# Patient Record
Sex: FEMALE | Race: BLACK OR AFRICAN AMERICAN | NOT HISPANIC OR LATINO | Employment: FULL TIME | URBAN - METROPOLITAN AREA
[De-identification: names, ages, dates, MRNs, and addresses within clinical notes are randomized per-mention and may not be internally consistent; named-entity substitution may affect disease eponyms.]

---

## 2020-11-20 ENCOUNTER — HOSPITAL ENCOUNTER (INPATIENT)
Facility: OTHER | Age: 50
LOS: 1 days | Discharge: HOME | DRG: 812 | End: 2020-11-21
Attending: EMERGENCY MEDICINE | Admitting: HOSPITALIST
Payer: COMMERCIAL

## 2020-11-20 DIAGNOSIS — J45.40 MODERATE PERSISTENT ASTHMA WITHOUT COMPLICATION: ICD-10-CM

## 2020-11-20 DIAGNOSIS — R00.1 BRADYCARDIA: ICD-10-CM

## 2020-11-20 DIAGNOSIS — T14.90XA TRAUMA: ICD-10-CM

## 2020-11-20 DIAGNOSIS — R94.31 PROLONGED Q-T INTERVAL ON ECG: ICD-10-CM

## 2020-11-20 DIAGNOSIS — R00.1 SYMPTOMATIC BRADYCARDIA: Primary | ICD-10-CM

## 2020-11-20 DIAGNOSIS — R00.1 SINUS BRADYCARDIA: ICD-10-CM

## 2020-11-20 DIAGNOSIS — D50.8 OTHER IRON DEFICIENCY ANEMIA: ICD-10-CM

## 2020-11-20 DIAGNOSIS — I95.1 ORTHOSTATIC HYPOTENSION: ICD-10-CM

## 2020-11-20 DIAGNOSIS — M48.30: ICD-10-CM

## 2020-11-20 DIAGNOSIS — D64.9 SEVERE ANEMIA: ICD-10-CM

## 2020-11-20 PROBLEM — D50.9 IRON DEFICIENCY ANEMIA: Status: ACTIVE | Noted: 2020-11-20

## 2020-11-20 LAB
ABO + RH BLD: NORMAL
ALBUMIN SERPL BCP-MCNC: 3.8 G/DL (ref 3.5–5.2)
ALP SERPL-CCNC: 69 U/L (ref 55–135)
ALT SERPL W/O P-5'-P-CCNC: 27 U/L (ref 10–44)
AMPHET+METHAMPHET UR QL: NEGATIVE
ANION GAP SERPL CALC-SCNC: 10 MMOL/L (ref 8–16)
ANISOCYTOSIS BLD QL SMEAR: SLIGHT
AST SERPL-CCNC: 30 U/L (ref 10–40)
B-HCG UR QL: NEGATIVE
BARBITURATES UR QL SCN>200 NG/ML: NEGATIVE
BASOPHILS # BLD AUTO: 0.02 K/UL (ref 0–0.2)
BASOPHILS NFR BLD: 0.3 % (ref 0–1.9)
BENZODIAZ UR QL SCN>200 NG/ML: NEGATIVE
BILIRUB SERPL-MCNC: 0.3 MG/DL (ref 0.1–1)
BLD GP AB SCN CELLS X3 SERPL QL: NORMAL
BLD PROD TYP BPU: NORMAL
BLD PROD TYP BPU: NORMAL
BLOOD UNIT EXPIRATION DATE: NORMAL
BLOOD UNIT EXPIRATION DATE: NORMAL
BLOOD UNIT TYPE CODE: 6200
BLOOD UNIT TYPE CODE: 6200
BLOOD UNIT TYPE: NORMAL
BLOOD UNIT TYPE: NORMAL
BNP SERPL-MCNC: 108 PG/ML (ref 0–99)
BUN SERPL-MCNC: 19 MG/DL (ref 6–20)
BZE UR QL SCN: NEGATIVE
CALCIUM SERPL-MCNC: 8.4 MG/DL (ref 8.7–10.5)
CANNABINOIDS UR QL SCN: NEGATIVE
CHLORIDE SERPL-SCNC: 101 MMOL/L (ref 95–110)
CO2 SERPL-SCNC: 20 MMOL/L (ref 23–29)
CODING SYSTEM: NORMAL
CODING SYSTEM: NORMAL
CREAT SERPL-MCNC: 1.4 MG/DL (ref 0.5–1.4)
CREAT UR-MCNC: 86.4 MG/DL (ref 15–325)
CTP QC/QA: YES
CTP QC/QA: YES
DACRYOCYTES BLD QL SMEAR: ABNORMAL
DIFFERENTIAL METHOD: ABNORMAL
DISPENSE STATUS: NORMAL
DISPENSE STATUS: NORMAL
EOSINOPHIL # BLD AUTO: 0.4 K/UL (ref 0–0.5)
EOSINOPHIL NFR BLD: 5.6 % (ref 0–8)
ERYTHROCYTE [DISTWIDTH] IN BLOOD BY AUTOMATED COUNT: 19.1 % (ref 11.5–14.5)
EST. GFR  (AFRICAN AMERICAN): 51 ML/MIN/1.73 M^2
EST. GFR  (NON AFRICAN AMERICAN): 44 ML/MIN/1.73 M^2
FERRITIN SERPL-MCNC: 7 NG/ML (ref 20–300)
GLUCOSE SERPL-MCNC: 101 MG/DL (ref 70–110)
HCT VFR BLD AUTO: 21.3 % (ref 37–48.5)
HGB BLD-MCNC: 5.3 G/DL (ref 12–16)
HYPOCHROMIA BLD QL SMEAR: ABNORMAL
IMM GRANULOCYTES # BLD AUTO: 0.01 K/UL (ref 0–0.04)
IMM GRANULOCYTES NFR BLD AUTO: 0.1 % (ref 0–0.5)
IRON SERPL-MCNC: 14 UG/DL (ref 30–160)
LYMPHOCYTES # BLD AUTO: 1.5 K/UL (ref 1–4.8)
LYMPHOCYTES NFR BLD: 22.7 % (ref 18–48)
MAGNESIUM SERPL-MCNC: 1.8 MG/DL (ref 1.6–2.6)
MCH RBC QN AUTO: 15.9 PG (ref 27–31)
MCHC RBC AUTO-ENTMCNC: 24.9 G/DL (ref 32–36)
MCV RBC AUTO: 64 FL (ref 82–98)
METHADONE UR QL SCN>300 NG/ML: NEGATIVE
MONOCYTES # BLD AUTO: 0.7 K/UL (ref 0.3–1)
MONOCYTES NFR BLD: 9.7 % (ref 4–15)
NEUTROPHILS # BLD AUTO: 4.2 K/UL (ref 1.8–7.7)
NEUTROPHILS NFR BLD: 61.6 % (ref 38–73)
NRBC BLD-RTO: 0 /100 WBC
OPIATES UR QL SCN: NEGATIVE
OVALOCYTES BLD QL SMEAR: ABNORMAL
PCP UR QL SCN>25 NG/ML: NEGATIVE
PLATELET # BLD AUTO: 420 K/UL (ref 150–350)
PLATELET BLD QL SMEAR: ABNORMAL
PMV BLD AUTO: 10.4 FL (ref 9.2–12.9)
POIKILOCYTOSIS BLD QL SMEAR: SLIGHT
POLYCHROMASIA BLD QL SMEAR: ABNORMAL
POTASSIUM SERPL-SCNC: 4.1 MMOL/L (ref 3.5–5.1)
PROT SERPL-MCNC: 7.2 G/DL (ref 6–8.4)
RBC # BLD AUTO: 3.33 M/UL (ref 4–5.4)
SARS-COV-2 RDRP RESP QL NAA+PROBE: NEGATIVE
SATURATED IRON: 2 % (ref 20–50)
SODIUM SERPL-SCNC: 131 MMOL/L (ref 136–145)
TOTAL IRON BINDING CAPACITY: 567 UG/DL (ref 250–450)
TOXICOLOGY INFORMATION: NORMAL
TRANS ERYTHROCYTES VOL PATIENT: NORMAL ML
TRANS ERYTHROCYTES VOL PATIENT: NORMAL ML
TRANSFERRIN SERPL-MCNC: 383 MG/DL (ref 200–375)
TROPONIN I SERPL DL<=0.01 NG/ML-MCNC: <0.006 NG/ML (ref 0–0.03)
TSH SERPL DL<=0.005 MIU/L-ACNC: 3.59 UIU/ML (ref 0.4–4)
WBC # BLD AUTO: 6.78 K/UL (ref 3.9–12.7)

## 2020-11-20 PROCEDURE — 99285 EMERGENCY DEPT VISIT HI MDM: CPT | Mod: 25

## 2020-11-20 PROCEDURE — 93005 ELECTROCARDIOGRAM TRACING: CPT

## 2020-11-20 PROCEDURE — 81025 URINE PREGNANCY TEST: CPT | Performed by: EMERGENCY MEDICINE

## 2020-11-20 PROCEDURE — 85025 COMPLETE CBC W/AUTO DIFF WBC: CPT

## 2020-11-20 PROCEDURE — 99223 PR INITIAL HOSPITAL CARE,LEVL III: ICD-10-PCS | Mod: ,,, | Performed by: INTERNAL MEDICINE

## 2020-11-20 PROCEDURE — 99223 1ST HOSP IP/OBS HIGH 75: CPT | Mod: ,,, | Performed by: HOSPITALIST

## 2020-11-20 PROCEDURE — 82728 ASSAY OF FERRITIN: CPT

## 2020-11-20 PROCEDURE — 99223 PR INITIAL HOSPITAL CARE,LEVL III: ICD-10-PCS | Mod: ,,, | Performed by: HOSPITALIST

## 2020-11-20 PROCEDURE — 86850 RBC ANTIBODY SCREEN: CPT

## 2020-11-20 PROCEDURE — 25000003 PHARM REV CODE 250: Performed by: EMERGENCY MEDICINE

## 2020-11-20 PROCEDURE — 83880 ASSAY OF NATRIURETIC PEPTIDE: CPT

## 2020-11-20 PROCEDURE — 93010 ELECTROCARDIOGRAM REPORT: CPT | Mod: 76,,, | Performed by: INTERNAL MEDICINE

## 2020-11-20 PROCEDURE — U0002 COVID-19 LAB TEST NON-CDC: HCPCS | Performed by: EMERGENCY MEDICINE

## 2020-11-20 PROCEDURE — 99223 1ST HOSP IP/OBS HIGH 75: CPT | Mod: ,,, | Performed by: INTERNAL MEDICINE

## 2020-11-20 PROCEDURE — 83735 ASSAY OF MAGNESIUM: CPT

## 2020-11-20 PROCEDURE — 99900035 HC TECH TIME PER 15 MIN (STAT)

## 2020-11-20 PROCEDURE — 93041 RHYTHM ECG TRACING: CPT

## 2020-11-20 PROCEDURE — 63600175 PHARM REV CODE 636 W HCPCS: Performed by: HOSPITALIST

## 2020-11-20 PROCEDURE — P9021 RED BLOOD CELLS UNIT: HCPCS

## 2020-11-20 PROCEDURE — 25000242 PHARM REV CODE 250 ALT 637 W/ HCPCS: Performed by: HOSPITALIST

## 2020-11-20 PROCEDURE — 25000242 PHARM REV CODE 250 ALT 637 W/ HCPCS: Performed by: EMERGENCY MEDICINE

## 2020-11-20 PROCEDURE — 27100098 HC SPACER

## 2020-11-20 PROCEDURE — 94640 AIRWAY INHALATION TREATMENT: CPT

## 2020-11-20 PROCEDURE — 25000003 PHARM REV CODE 250: Performed by: INTERNAL MEDICINE

## 2020-11-20 PROCEDURE — 80053 COMPREHEN METABOLIC PANEL: CPT

## 2020-11-20 PROCEDURE — 80307 DRUG TEST PRSMV CHEM ANLYZR: CPT

## 2020-11-20 PROCEDURE — 93010 EKG 12-LEAD: ICD-10-PCS | Mod: 76,,, | Performed by: INTERNAL MEDICINE

## 2020-11-20 PROCEDURE — 25000003 PHARM REV CODE 250: Performed by: HOSPITALIST

## 2020-11-20 PROCEDURE — 20000000 HC ICU ROOM

## 2020-11-20 PROCEDURE — 86920 COMPATIBILITY TEST SPIN: CPT

## 2020-11-20 PROCEDURE — 84484 ASSAY OF TROPONIN QUANT: CPT

## 2020-11-20 PROCEDURE — 36430 TRANSFUSION BLD/BLD COMPNT: CPT

## 2020-11-20 PROCEDURE — 84443 ASSAY THYROID STIM HORMONE: CPT

## 2020-11-20 PROCEDURE — 25000003 PHARM REV CODE 250: Performed by: NURSE PRACTITIONER

## 2020-11-20 PROCEDURE — 83540 ASSAY OF IRON: CPT

## 2020-11-20 PROCEDURE — 36415 COLL VENOUS BLD VENIPUNCTURE: CPT

## 2020-11-20 PROCEDURE — 93010 ELECTROCARDIOGRAM REPORT: CPT | Mod: ,,, | Performed by: INTERNAL MEDICINE

## 2020-11-20 PROCEDURE — 94761 N-INVAS EAR/PLS OXIMETRY MLT: CPT

## 2020-11-20 RX ORDER — HYDROCODONE BITARTRATE AND ACETAMINOPHEN 500; 5 MG/1; MG/1
TABLET ORAL
Status: DISCONTINUED | OUTPATIENT
Start: 2020-11-20 | End: 2020-11-21 | Stop reason: HOSPADM

## 2020-11-20 RX ORDER — CYCLOBENZAPRINE HCL 10 MG
TABLET ORAL
Status: ON HOLD | COMMUNITY
Start: 2020-09-01 | End: 2020-11-21 | Stop reason: HOSPADM

## 2020-11-20 RX ORDER — ALBUTEROL SULFATE 90 UG/1
2 AEROSOL, METERED RESPIRATORY (INHALATION) EVERY 6 HOURS PRN
Status: DISCONTINUED | OUTPATIENT
Start: 2020-11-20 | End: 2020-11-21 | Stop reason: HOSPADM

## 2020-11-20 RX ORDER — ESZOPICLONE 3 MG/1
3 TABLET, FILM COATED ORAL NIGHTLY PRN
COMMUNITY
Start: 2020-10-14 | End: 2022-02-07

## 2020-11-20 RX ORDER — IPRATROPIUM BROMIDE AND ALBUTEROL SULFATE 2.5; .5 MG/3ML; MG/3ML
3 SOLUTION RESPIRATORY (INHALATION)
Status: COMPLETED | OUTPATIENT
Start: 2020-11-20 | End: 2020-11-20

## 2020-11-20 RX ORDER — TRAMADOL HYDROCHLORIDE 50 MG/1
50 TABLET ORAL EVERY 6 HOURS PRN
Status: DISCONTINUED | OUTPATIENT
Start: 2020-11-20 | End: 2020-11-21 | Stop reason: HOSPADM

## 2020-11-20 RX ORDER — MUPIROCIN 20 MG/G
OINTMENT TOPICAL 2 TIMES DAILY
Status: DISCONTINUED | OUTPATIENT
Start: 2020-11-20 | End: 2020-11-21 | Stop reason: HOSPADM

## 2020-11-20 RX ORDER — CELECOXIB 200 MG/1
CAPSULE ORAL
COMMUNITY
Start: 2020-09-17 | End: 2021-11-06

## 2020-11-20 RX ORDER — FAMOTIDINE 20 MG/1
20 TABLET, FILM COATED ORAL 2 TIMES DAILY PRN
Status: DISCONTINUED | OUTPATIENT
Start: 2020-11-20 | End: 2020-11-21 | Stop reason: HOSPADM

## 2020-11-20 RX ORDER — TALC
6 POWDER (GRAM) TOPICAL NIGHTLY PRN
Status: DISCONTINUED | OUTPATIENT
Start: 2020-11-20 | End: 2020-11-20 | Stop reason: SDUPTHER

## 2020-11-20 RX ORDER — LORAZEPAM 0.5 MG/1
TABLET ORAL
COMMUNITY
Start: 2020-10-14 | End: 2022-02-14

## 2020-11-20 RX ORDER — SODIUM CHLORIDE 0.9 % (FLUSH) 0.9 %
10 SYRINGE (ML) INJECTION
Status: DISCONTINUED | OUTPATIENT
Start: 2020-11-20 | End: 2020-11-21 | Stop reason: HOSPADM

## 2020-11-20 RX ORDER — TALC
6 POWDER (GRAM) TOPICAL NIGHTLY PRN
Status: DISCONTINUED | OUTPATIENT
Start: 2020-11-20 | End: 2020-11-21 | Stop reason: HOSPADM

## 2020-11-20 RX ORDER — METOPROLOL SUCCINATE 100 MG/1
100 TABLET, EXTENDED RELEASE ORAL DAILY
Status: ON HOLD | COMMUNITY
Start: 2020-07-15 | End: 2020-11-21 | Stop reason: HOSPADM

## 2020-11-20 RX ORDER — POLYETHYLENE GLYCOL 3350 17 G/17G
17 POWDER, FOR SOLUTION ORAL 2 TIMES DAILY PRN
Status: DISCONTINUED | OUTPATIENT
Start: 2020-11-20 | End: 2020-11-21 | Stop reason: HOSPADM

## 2020-11-20 RX ORDER — HYDROCHLOROTHIAZIDE 25 MG/1
25 TABLET ORAL DAILY
Status: ON HOLD | COMMUNITY
Start: 2020-08-13 | End: 2020-11-21 | Stop reason: HOSPADM

## 2020-11-20 RX ORDER — BUPROPION HYDROCHLORIDE 150 MG/1
300 TABLET ORAL DAILY
Status: DISCONTINUED | OUTPATIENT
Start: 2020-11-20 | End: 2020-11-21 | Stop reason: HOSPADM

## 2020-11-20 RX ORDER — BUPROPION HYDROCHLORIDE 300 MG/1
300 TABLET ORAL DAILY
COMMUNITY
Start: 2020-07-22 | End: 2022-02-14

## 2020-11-20 RX ORDER — ONDANSETRON 4 MG/1
4 TABLET, FILM COATED ORAL EVERY 6 HOURS PRN
Status: DISCONTINUED | OUTPATIENT
Start: 2020-11-20 | End: 2020-11-21 | Stop reason: HOSPADM

## 2020-11-20 RX ORDER — SODIUM CHLORIDE 9 MG/ML
500 INJECTION, SOLUTION INTRAVENOUS
Status: COMPLETED | OUTPATIENT
Start: 2020-11-20 | End: 2020-11-20

## 2020-11-20 RX ORDER — ZOLPIDEM TARTRATE 5 MG/1
5 TABLET ORAL NIGHTLY PRN
Status: COMPLETED | OUTPATIENT
Start: 2020-11-20 | End: 2020-11-20

## 2020-11-20 RX ORDER — DICLOFENAC SODIUM AND MISOPROSTOL 75; 200 MG/1; UG/1
1 TABLET, DELAYED RELEASE ORAL 2 TIMES DAILY
Status: ON HOLD | COMMUNITY
Start: 2020-09-02 | End: 2020-11-21 | Stop reason: HOSPADM

## 2020-11-20 RX ORDER — ALBUTEROL SULFATE 90 UG/1
2 AEROSOL, METERED RESPIRATORY (INHALATION) EVERY 6 HOURS PRN
COMMUNITY
Start: 2020-01-23 | End: 2022-05-23

## 2020-11-20 RX ORDER — LORAZEPAM 0.5 MG/1
0.5 TABLET ORAL EVERY 6 HOURS PRN
Status: DISCONTINUED | OUTPATIENT
Start: 2020-11-20 | End: 2020-11-21 | Stop reason: HOSPADM

## 2020-11-20 RX ORDER — FLUTICASONE FUROATE AND VILANTEROL 100; 25 UG/1; UG/1
1 POWDER RESPIRATORY (INHALATION) DAILY
COMMUNITY
Start: 2020-07-02

## 2020-11-20 RX ORDER — FAMOTIDINE 20 MG/1
20 TABLET, FILM COATED ORAL 2 TIMES DAILY PRN
COMMUNITY
Start: 2020-05-04 | End: 2023-11-01

## 2020-11-20 RX ORDER — GABAPENTIN 300 MG/1
300 CAPSULE ORAL NIGHTLY
Status: DISCONTINUED | OUTPATIENT
Start: 2020-11-20 | End: 2020-11-21 | Stop reason: HOSPADM

## 2020-11-20 RX ORDER — GABAPENTIN 300 MG/1
400 CAPSULE ORAL NIGHTLY
COMMUNITY
Start: 2020-09-09 | End: 2022-08-01

## 2020-11-20 RX ORDER — FLUTICASONE FUROATE AND VILANTEROL 100; 25 UG/1; UG/1
1 POWDER RESPIRATORY (INHALATION) DAILY
Status: DISCONTINUED | OUTPATIENT
Start: 2020-11-20 | End: 2020-11-21 | Stop reason: HOSPADM

## 2020-11-20 RX ORDER — ACETAMINOPHEN 325 MG/1
650 TABLET ORAL EVERY 4 HOURS PRN
Status: DISCONTINUED | OUTPATIENT
Start: 2020-11-20 | End: 2020-11-21 | Stop reason: HOSPADM

## 2020-11-20 RX ADMIN — SODIUM CHLORIDE 500 ML: 0.9 INJECTION, SOLUTION INTRAVENOUS at 11:11

## 2020-11-20 RX ADMIN — IPRATROPIUM BROMIDE AND ALBUTEROL SULFATE 3 ML: .5; 3 SOLUTION RESPIRATORY (INHALATION) at 11:11

## 2020-11-20 RX ADMIN — BUPROPION HYDROCHLORIDE 300 MG: 150 TABLET, FILM COATED, EXTENDED RELEASE ORAL at 02:11

## 2020-11-20 RX ADMIN — Medication 6 MG: at 09:11

## 2020-11-20 RX ADMIN — GABAPENTIN 300 MG: 300 CAPSULE ORAL at 09:11

## 2020-11-20 RX ADMIN — PROMETHAZINE HYDROCHLORIDE 25 MG: 25 INJECTION INTRAMUSCULAR; INTRAVENOUS at 08:11

## 2020-11-20 RX ADMIN — ZOLPIDEM TARTRATE 5 MG: 5 TABLET ORAL at 11:11

## 2020-11-20 RX ADMIN — MUPIROCIN: 20 OINTMENT TOPICAL at 09:11

## 2020-11-20 RX ADMIN — ALBUTEROL SULFATE 2 PUFF: 90 AEROSOL, METERED RESPIRATORY (INHALATION) at 09:11

## 2020-11-20 RX ADMIN — ONDANSETRON HYDROCHLORIDE 4 MG: 4 TABLET, FILM COATED ORAL at 07:11
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
## 2020-11-20 NOTE — ASSESSMENT & PLAN NOTE
- Patient with sinus bradycardia and hypotension, likely medication induced (beta blocker, cyclobenzaprine).  - Patient started on metoprolol a few months ago, dose increased recently by PCP.  Previously was on amlodipine.  - Hold beta blocker & cyclobenzaprine, monitor in ICU, watch for development of HTN.  - Cardiologist consulted.
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
## 2020-11-20 NOTE — ASSESSMENT & PLAN NOTE
- Orthostatic hypotension due to hypovolemia from anemia, bradycardia.  - Currently very weak and fatigued, continue on bed rest for now.  - Hold antihypertensives, muscle relaxant.

## 2020-11-20 NOTE — PLAN OF CARE
CM met with pt for initial discharge planning assessment. Pt is alert and oriented at time of assessment.    Pt confirmed demographic information is not correct in Epic, she provided her current address, CM corrected in Epic. PCP is correct, , and pharmacy of choice is [facility] on [address].    Pt is usually independent, uses no DME and with no HH. Pt is not on HD and does not take coumadin.  Pt states she takes meds as ordered, and has  no financial issues with meds at this time.    Pt will likely have no CM needs for discharge.   11/20/20 6863   Discharge Assessment   Assessment Type Discharge Planning Assessment   Confirmed/corrected address and phone number on facesheet? Yes   Assessment information obtained from? Patient;Medical Record   Expected Length of Stay (days) 3   Communicated expected length of stay with patient/caregiver yes   Prior to hospitilization cognitive status: Alert/Oriented   Prior to hospitalization functional status: Independent   Current cognitive status: Alert/Oriented   Current Functional Status: Needs Assistance   Lives With child(kevon), adult   Able to Return to Prior Arrangements yes   Is patient able to care for self after discharge? Yes   Patient's perception of discharge disposition home or selfcare   Readmission Within the Last 30 Days no previous admission in last 30 days   Patient currently being followed by outpatient case management? No   Patient currently receives any other outside agency services? No   Equipment Currently Used at Home none   Do you have any problems affording any of your prescribed medications? No   Is the patient taking medications as prescribed? yes   Does the patient have transportation home? Yes   Transportation Anticipated family or friend will provide;car, drives self   Does the patient receive services at the [facility]? No   Discharge Plan A Home   Discharge Plan B Home   DME Needed Upon Discharge  none   Patient/Family  in Agreement with Plan yes

## 2020-11-20 NOTE — ED TRIAGE NOTES
Pt AAOx4,very groggy and lethargic in bed. Pt reports falling last night in the shower due to weakness in BLE. Pt denies LOC, or hurting herself. Pt denies SOB, chest pain, N/V/D and/or fever/chills.
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
## 2020-11-20 NOTE — ASSESSMENT & PLAN NOTE
- Patient with iron deficiency, hospitalized for transfusions in 2018 and then referred to hematology for Feraheme infusions.  These were discontinued due to her having side effects including joint pain and fatigue.  - Hematologist at [facility] referred to her having heavy menses but patient denies this.  - Patient being transfused 2 units with Hb 5.3.  Iron studies pending but would expect iron stores to be very low with MCV 64.  - Full GI workup has been done with EGD/colonoscopy several years ago, no abnormalities seen.  - Will consult hematology, plan for iron infusions while here.
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
## 2020-11-20 NOTE — HOSPITAL COURSE
Patient was admitted to ICU, transfused 2 units PRBCs.  Cardiology was consulted and agreed with holding beta blocker and monitoring closely.  Overnight bradycardia resolved and HR was in the 60's-70's.  She felt much better after transfusion.  Iron studies were done with iron stores 14, TIBC 567, ferritin 7.  Her H/H improved appropriately to 7.4/26.2 after transfusion.  She did have some wheezing on the morning after admission requiring albuterol administration.  As she uses her rescue inhaler about twice daily at home she was continued on her steroid/LABA combination inhaler.    Patient will benefit from resumption of iron infusions.  She was given a 500 mg IV dose of Venofer prior to discharge and will need another dose in 2 weeks.  She should follow up with her hematologist at that time.  Her BP was starting to increase on discharge and because metoprolol was discontinued she was started on losartan/HCTZ 100/25 mg daily.  She will need to follow up with her PCP for blood pressure management in 1-2 weeks.  She has an SHIRAZ scheduled with Dr. Scruggs on Monday and may keep that appointment.

## 2020-11-21 VITALS
SYSTOLIC BLOOD PRESSURE: 161 MMHG | WEIGHT: 188.69 LBS | OXYGEN SATURATION: 100 % | HEART RATE: 56 BPM | DIASTOLIC BLOOD PRESSURE: 77 MMHG | HEIGHT: 65 IN | RESPIRATION RATE: 17 BRPM | BODY MASS INDEX: 31.44 KG/M2 | TEMPERATURE: 99 F

## 2020-11-21 PROBLEM — J45.40 MODERATE PERSISTENT ASTHMA WITHOUT COMPLICATION: Status: ACTIVE | Noted: 2020-11-21

## 2020-11-21 LAB
ANION GAP SERPL CALC-SCNC: 7 MMOL/L (ref 8–16)
BASOPHILS # BLD AUTO: 0.03 K/UL (ref 0–0.2)
BASOPHILS NFR BLD: 0.2 % (ref 0–1.9)
BUN SERPL-MCNC: 14 MG/DL (ref 6–20)
CALCIUM SERPL-MCNC: 8.7 MG/DL (ref 8.7–10.5)
CHLORIDE SERPL-SCNC: 108 MMOL/L (ref 95–110)
CO2 SERPL-SCNC: 23 MMOL/L (ref 23–29)
CREAT SERPL-MCNC: 1.2 MG/DL (ref 0.5–1.4)
DIFFERENTIAL METHOD: ABNORMAL
EOSINOPHIL # BLD AUTO: 0.3 K/UL (ref 0–0.5)
EOSINOPHIL NFR BLD: 2 % (ref 0–8)
ERYTHROCYTE [DISTWIDTH] IN BLOOD BY AUTOMATED COUNT: 22.9 % (ref 11.5–14.5)
EST. GFR  (AFRICAN AMERICAN): >60 ML/MIN/1.73 M^2
EST. GFR  (NON AFRICAN AMERICAN): 54 ML/MIN/1.73 M^2
GLUCOSE SERPL-MCNC: 77 MG/DL (ref 70–110)
HCT VFR BLD AUTO: 26.2 % (ref 37–48.5)
HGB BLD-MCNC: 7.4 G/DL (ref 12–16)
IMM GRANULOCYTES # BLD AUTO: 0.04 K/UL (ref 0–0.04)
IMM GRANULOCYTES NFR BLD AUTO: 0.3 % (ref 0–0.5)
LYMPHOCYTES # BLD AUTO: 1.9 K/UL (ref 1–4.8)
LYMPHOCYTES NFR BLD: 15 % (ref 18–48)
MAGNESIUM SERPL-MCNC: 1.8 MG/DL (ref 1.6–2.6)
MCH RBC QN AUTO: 18.9 PG (ref 27–31)
MCHC RBC AUTO-ENTMCNC: 28.2 G/DL (ref 32–36)
MCV RBC AUTO: 67 FL (ref 82–98)
MONOCYTES # BLD AUTO: 0.7 K/UL (ref 0.3–1)
MONOCYTES NFR BLD: 5.2 % (ref 4–15)
NEUTROPHILS # BLD AUTO: 9.9 K/UL (ref 1.8–7.7)
NEUTROPHILS NFR BLD: 77.3 % (ref 38–73)
NRBC BLD-RTO: 0 /100 WBC
PHOSPHATE SERPL-MCNC: 3.6 MG/DL (ref 2.7–4.5)
PLATELET # BLD AUTO: 384 K/UL (ref 150–350)
PMV BLD AUTO: 9.8 FL (ref 9.2–12.9)
POTASSIUM SERPL-SCNC: 4 MMOL/L (ref 3.5–5.1)
RBC # BLD AUTO: 3.91 M/UL (ref 4–5.4)
SODIUM SERPL-SCNC: 138 MMOL/L (ref 136–145)
WBC # BLD AUTO: 12.75 K/UL (ref 3.9–12.7)

## 2020-11-21 PROCEDURE — 85025 COMPLETE CBC W/AUTO DIFF WBC: CPT

## 2020-11-21 PROCEDURE — 99232 PR SUBSEQUENT HOSPITAL CARE,LEVL II: ICD-10-PCS | Mod: ,,, | Performed by: INTERNAL MEDICINE

## 2020-11-21 PROCEDURE — 99238 PR HOSPITAL DISCHARGE DAY,<30 MIN: ICD-10-PCS | Mod: ,,, | Performed by: HOSPITALIST

## 2020-11-21 PROCEDURE — 80048 BASIC METABOLIC PNL TOTAL CA: CPT

## 2020-11-21 PROCEDURE — 83735 ASSAY OF MAGNESIUM: CPT

## 2020-11-21 PROCEDURE — 99238 HOSP IP/OBS DSCHRG MGMT 30/<: CPT | Mod: ,,, | Performed by: HOSPITALIST

## 2020-11-21 PROCEDURE — 84100 ASSAY OF PHOSPHORUS: CPT

## 2020-11-21 PROCEDURE — 25000242 PHARM REV CODE 250 ALT 637 W/ HCPCS: Performed by: HOSPITALIST

## 2020-11-21 PROCEDURE — 94640 AIRWAY INHALATION TREATMENT: CPT

## 2020-11-21 PROCEDURE — 36415 COLL VENOUS BLD VENIPUNCTURE: CPT

## 2020-11-21 PROCEDURE — 99232 SBSQ HOSP IP/OBS MODERATE 35: CPT | Mod: ,,, | Performed by: INTERNAL MEDICINE

## 2020-11-21 PROCEDURE — 25000003 PHARM REV CODE 250: Performed by: HOSPITALIST

## 2020-11-21 PROCEDURE — 63600175 PHARM REV CODE 636 W HCPCS: Performed by: HOSPITALIST

## 2020-11-21 PROCEDURE — 90686 IIV4 VACC NO PRSV 0.5 ML IM: CPT | Performed by: HOSPITALIST

## 2020-11-21 PROCEDURE — 90471 IMMUNIZATION ADMIN: CPT | Performed by: HOSPITALIST

## 2020-11-21 PROCEDURE — 94761 N-INVAS EAR/PLS OXIMETRY MLT: CPT

## 2020-11-21 RX ORDER — FERROUS SULFATE 325(65) MG
325 TABLET ORAL
Refills: 0 | COMMUNITY
Start: 2020-11-21 | End: 2022-05-23

## 2020-11-21 RX ORDER — ASCORBIC ACID 500 MG
500 TABLET ORAL DAILY
COMMUNITY
Start: 2020-11-21

## 2020-11-21 RX ORDER — HYDROCHLOROTHIAZIDE 12.5 MG/1
25 TABLET ORAL DAILY
Status: DISCONTINUED | OUTPATIENT
Start: 2020-11-21 | End: 2020-11-21 | Stop reason: HOSPADM

## 2020-11-21 RX ORDER — LOSARTAN POTASSIUM 50 MG/1
50 TABLET ORAL DAILY
Status: DISCONTINUED | OUTPATIENT
Start: 2020-11-21 | End: 2020-11-21 | Stop reason: HOSPADM

## 2020-11-21 RX ORDER — LOSARTAN POTASSIUM AND HYDROCHLOROTHIAZIDE 25; 100 MG/1; MG/1
1 TABLET ORAL DAILY
Qty: 90 TABLET | Refills: 0 | Status: SHIPPED | OUTPATIENT
Start: 2020-11-21 | End: 2023-02-13

## 2020-11-21 RX ADMIN — FLUTICASONE FUROATE AND VILANTEROL TRIFENATATE 1 PUFF: 100; 25 POWDER RESPIRATORY (INHALATION) at 07:11

## 2020-11-21 RX ADMIN — IRON SUCROSE 500 MG: 20 INJECTION, SOLUTION INTRAVENOUS at 08:11

## 2020-11-21 RX ADMIN — LOSARTAN POTASSIUM 50 MG: 50 TABLET, FILM COATED ORAL at 12:11

## 2020-11-21 RX ADMIN — INFLUENZA VIRUS VACCINE 0.5 ML: 15; 15; 15; 15 SUSPENSION INTRAMUSCULAR at 02:11

## 2020-11-21 RX ADMIN — HYDROCHLOROTHIAZIDE 25 MG: 12.5 TABLET ORAL at 12:11

## 2020-11-21 RX ADMIN — ALBUTEROL SULFATE 2 PUFF: 90 AEROSOL, METERED RESPIRATORY (INHALATION) at 07:11

## 2020-11-21 RX ADMIN — BUPROPION HYDROCHLORIDE 300 MG: 150 TABLET, FILM COATED, EXTENDED RELEASE ORAL at 08:11

## 2020-11-21 RX ADMIN — MUPIROCIN: 20 OINTMENT TOPICAL at 08:11

## 2020-11-21 NOTE — PLAN OF CARE
Patient has a dx of symptomatic bradycardia. AAOx4, on RA, afebrile, no c/o pain. In SB-NSR. Received 2 u pRBCs yesterday with appropriate rise in h/h. Prn Zofran 4mg PO x 1 and prn Phenergen 25mg IVPB x 1 effective for nausea relief. Prn Melatonin 6 mg PO x 1 and prn Ambien 5 mg PO x 1 did not relieve insomnia. Encouraged patient to have family bring home medication of Lunesta to the hospital in its prescription bottle if she stays here one more night.
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
## 2020-11-21 NOTE — NURSING
Tolerated iron infusion without incident   Sitting on the side of the bed on her phone  Restarted on meds as b/p slightly increasing   Heart rate within limits   No complaints of dizziness or shortness of breath

## 2020-11-21 NOTE — NURSING
Reviewed discharge instructions along with follow up appointments and changes to medication   Verbalizes understanding   Would not allow me to take her downstairs per wheelchair - wanted to ambulate  Gait is steady   Son here to pick her up in personal vehicle

## 2020-11-21 NOTE — PROGRESS NOTES
[facility]    Admission date:  11/20/2020     Assessment  1.  Presyncope  Heart rate is better off beta-blockers.  Should continue to improve.     2.  Essential hypertension  Blood pressure trending up off metoprolol     3.  Anemia  Appropriate response to transfusion    Plan and Discussion  Agree with plans for discharge today.  She reports that amlodipine was not effective in the past.  Would consider a combination of an ARB and hydrochlorothiazide.  She can follow up with Dr. Chinchilla, her primary, for further titration of her antihypertensive medications.    Subjective  Feels well.  No complaints.    Medications  Current Facility-Administered Medications   Medication Dose Route Frequency Provider Last Rate Last Dose    0.9%  NaCl infusion (for blood administration)   Intravenous Q24H PRN Roxana Mistry MD        acetaminophen tablet 650 mg  650 mg Oral Q4H PRN Roxana Mistry MD        albuterol inhaler 2 puff  2 puff Inhalation Q6H PRN Roxana Mistry MD   2 puff at 11/21/20 0729    buPROPion TB24 tablet 300 mg  300 mg Oral Daily Roxana Mistry MD   300 mg at 11/21/20 0810    famotidine tablet 20 mg  20 mg Oral BID PRN Roxana Mistry MD        fluticasone furoate-vilanteroL 100-25 mcg/dose diskus inhaler 1 puff  1 puff Inhalation Daily Roxana Mistry MD   1 puff at 11/21/20 0728    gabapentin capsule 300 mg  300 mg Oral QHS Roxana Mistry MD   300 mg at 11/20/20 2103    influenza (QUADRIVALENT PF) vaccine 0.5 mL  0.5 mL Intramuscular vaccine x 1 dose Roxana Mistry MD        iron sucrose (VENOFER) 500 mg in sodium chloride 0.9% 250 mL IVPB  500 mg Intravenous Once Roxana Mistry MD 71.4 mL/hr at 11/21/20 0810 500 mg at 11/21/20 0810    LORazepam tablet 0.5 mg  0.5 mg Oral Q6H PRN Roxana Mistry MD        melatonin tablet 6 mg  6 mg Oral Nightly PRN Roxana Mistry MD   6 mg at 11/20/20 2151    mupirocin 2 % ointment   Nasal BID Roxana TALLEY  MD Fidelia        ondansetron tablet 4 mg  4 mg Oral Q6H PRN Hola Ferreira, NP   4 mg at 11/20/20 1901    polyethylene glycol packet 17 g  17 g Oral BID PRN Roxana Mistry MD        promethazine (PHENERGAN) 25 mg in dextrose 5 % 50 mL IVPB  25 mg Intravenous Q6H PRN Roxana Mistry  mL/hr at 11/20/20 2007 25 mg at 11/20/20 2007    sodium chloride 0.9% flush 10 mL  10 mL Intravenous PRN Roxana Mistry MD        traMADoL tablet 50 mg  50 mg Oral Q6H PRN Roxana Mistry MD            Physical Exam  Temp:  [95.9 °F (35.5 °C)-99.4 °F (37.4 °C)]   Pulse:  [20-96]   Resp:  [10-42]   BP: ()/(52-89)   SpO2:  [61 %-100 %]    Wt Readings from Last 3 Encounters:   11/20/20 85.6 kg (188 lb 11.4 oz)     Physical Exam   Constitutional: She is oriented to person, place, and time. She is cooperative.  Non-toxic appearance. No distress.   HENT:   Head: Normocephalic and atraumatic.   Neck: No JVD present. Carotid bruit is not present.   Cardiovascular: Normal rate, regular rhythm, S1 normal and S2 normal.  No extrasystoles are present. PMI is not displaced. Exam reveals no S3 and no S4.   No murmur heard.  Pulses:       Carotid pulses are 2+ on the right side and 2+ on the left side.       Radial pulses are 2+ on the right side and 2+ on the left side.        Posterior tibial pulses are 2+ on the right side and 2+ on the left side.   Pulmonary/Chest: No accessory muscle usage. No respiratory distress. She has no decreased breath sounds. She has no wheezes. She has no rhonchi. She has no rales.   Abdominal: Soft. Bowel sounds are normal. There is no splenomegaly or hepatomegaly. There is no abdominal tenderness.   Musculoskeletal:         General: No edema.   Neurological: She is alert and oriented to person, place, and time. She has normal strength. No sensory deficit.   Skin: Skin is warm, dry and intact. She is not diaphoretic. No cyanosis. Nails show no clubbing.   Psychiatric: She has a  normal mood and affect. Her speech is normal and behavior is normal.       Telemetry  Sinus rhythm/sinus bradycardia.  Rates higher than yesterday    Labs  Recent Results (from the past 24 hour(s))   Prepare RBC 2 Units; Symptomatic anemia    Collection Time: 11/20/20 11:41 AM   Result Value Ref Range    UNIT NUMBER [id number]     Product Code [id number]     DISPENSE STATUS TRANSFUSED     CODING SYSTEM [id number]     Unit Blood Type Code 6200     Unit Blood Type A POS     Unit Expiration [id number]     UNIT NUMBER [id number]     Product Code [id number]     DISPENSE STATUS TRANSFUSED     CODING SYSTEM [id number]     Unit Blood Type Code 6200     Unit Blood Type A POS     Unit Expiration [id number]    POCT COVID-19 Rapid Screening    Collection Time: 11/20/20 12:04 PM   Result Value Ref Range    POC Rapid COVID Negative Negative     Acceptable Yes    Type & Screen    Collection Time: 11/20/20 12:12 PM   Result Value Ref Range    Group & Rh AB POS     Indirect Noe NEG    Iron and TIBC    Collection Time: 11/20/20  1:35 PM   Result Value Ref Range    Iron 14 (L) 30 - 160 ug/dL    Transferrin 383 (H) 200 - 375 mg/dL    TIBC 567 (H) 250 - 450 ug/dL    Saturated Iron 2 (L) 20 - 50 %   Ferritin    Collection Time: 11/20/20  1:35 PM   Result Value Ref Range    Ferritin 7 (L) 20.0 - 300.0 ng/mL   Basic Metabolic Panel (BMP)    Collection Time: 11/21/20  3:20 AM   Result Value Ref Range    Sodium 138 136 - 145 mmol/L    Potassium 4.0 3.5 - 5.1 mmol/L    Chloride 108 95 - 110 mmol/L    CO2 23 23 - 29 mmol/L    Glucose 77 70 - 110 mg/dL    BUN 14 6 - 20 mg/dL    Creatinine 1.2 0.5 - 1.4 mg/dL    Calcium 8.7 8.7 - 10.5 mg/dL    Anion Gap 7 (L) 8 - 16 mmol/L    eGFR if African American >60 >60 mL/min/1.73 m^2    eGFR if non African American 54 (A) >60 mL/min/1.73 m^2   Magnesium    Collection Time: 11/21/20  3:20 AM   Result Value Ref Range    Magnesium 1.8 1.6 - 2.6 mg/dL   Phosphorus    Collection Time:  11/21/20  3:20 AM   Result Value Ref Range    Phosphorus 3.6 2.7 - 4.5 mg/dL   CBC with Automated Differential    Collection Time: 11/21/20  3:20 AM   Result Value Ref Range    WBC 12.75 (H) 3.90 - 12.70 K/uL    RBC 3.91 (L) 4.00 - 5.40 M/uL    Hemoglobin 7.4 (L) 12.0 - 16.0 g/dL    Hematocrit 26.2 (L) 37.0 - 48.5 %    MCV 67 (L) 82 - 98 fL    MCH 18.9 (L) 27.0 - 31.0 pg    MCHC 28.2 (L) 32.0 - 36.0 g/dL    RDW 22.9 (H) 11.5 - 14.5 %    Platelets 384 (H) 150 - 350 K/uL    MPV 9.8 9.2 - 12.9 fL    Immature Granulocytes 0.3 0.0 - 0.5 %    Gran # (ANC) 9.9 (H) 1.8 - 7.7 K/uL    Immature Grans (Abs) 0.04 0.00 - 0.04 K/uL    Lymph # 1.9 1.0 - 4.8 K/uL    Mono # 0.7 0.3 - 1.0 K/uL    Eos # 0.3 0.0 - 0.5 K/uL    Baso # 0.03 0.00 - 0.20 K/uL    nRBC 0 0 /100 WBC    Gran % 77.3 (H) 38.0 - 73.0 %    Lymph % 15.0 (L) 18.0 - 48.0 %    Mono % 5.2 4.0 - 15.0 %    Eosinophil % 2.0 0.0 - 8.0 %    Basophil % 0.2 0.0 - 1.9 %    Differential Method Automated            Eliu Malin MD

## 2020-11-21 NOTE — PLAN OF CARE
Patient cleared for discharge from case management standpoint.       11/21/20 1553   Final Note   Assessment Type Final Discharge Note   Anticipated Discharge Disposition Home   Discharge plans and expectations educations in teach back method with documentation complete? Yes   Right Care Referral Info   Post Acute Recommendation No Care

## 2020-11-21 NOTE — NURSING
Patient lying in bed and independent   Connected to cardiac monitoring   No complaints of shortness of breath or dizziness  Iron infusion in progress  Low grade temp this am  Will continue to monitor

## 2020-11-21 NOTE — PROGRESS NOTES
Insomnia - trial of melatonin- if not working try zolpidem. Pt using lunesta chronically which was not available on formulary here. RN checking for options with pharmacy.Also will need long term outpatient plan for insomnia .     225 Not able to sleep- try zolpidem prn

## 2020-11-23 ENCOUNTER — PATIENT OUTREACH (OUTPATIENT)
Dept: ADMINISTRATIVE | Facility: CLINIC | Age: 50
End: 2020-11-23

## 2020-11-23 NOTE — PROGRESS NOTES
C3 nurse attempted to contact patient. No answer. The following message was left for the patient to return the call:  Good evening I am a nurse calling on behalf of [facility] from the [facility].  This is a Transitional Care Call for Gwen. When you have a moment please contact us at [phone] or [phone] Monday through Friday, between the hours of 8 am to 4 pm. We look forward to speaking with you. On behalf of [facility] have a nice day.    The patient does not have a scheduled HOSFU appointment within 7-14 days post hospital discharge date 11/21/20. Non [facility] PCP.

## 2021-04-26 ENCOUNTER — PATIENT MESSAGE (OUTPATIENT)
Dept: RESEARCH | Facility: HOSPITAL | Age: 51
End: 2021-04-26

## 2021-11-06 ENCOUNTER — HOSPITAL ENCOUNTER (EMERGENCY)
Facility: OTHER | Age: 51
Discharge: HOME | End: 2021-11-06
Attending: EMERGENCY MEDICINE
Payer: COMMERCIAL

## 2021-11-06 VITALS
HEIGHT: 65 IN | BODY MASS INDEX: 31.16 KG/M2 | RESPIRATION RATE: 20 BRPM | HEART RATE: 68 BPM | SYSTOLIC BLOOD PRESSURE: 133 MMHG | DIASTOLIC BLOOD PRESSURE: 76 MMHG | WEIGHT: 187 LBS | OXYGEN SATURATION: 100 % | TEMPERATURE: 98 F

## 2021-11-06 DIAGNOSIS — M25.539 WRIST PAIN: ICD-10-CM

## 2021-11-06 DIAGNOSIS — R05.9 COUGH: ICD-10-CM

## 2021-11-06 DIAGNOSIS — S63.92XA HAND SPRAIN, LEFT, INITIAL ENCOUNTER: Primary | ICD-10-CM

## 2021-11-06 LAB
B-HCG UR QL: NEGATIVE
CTP QC/QA: YES
POC MOLECULAR INFLUENZA A AGN: NEGATIVE
POC MOLECULAR INFLUENZA B AGN: NEGATIVE
SARS-COV-2 RDRP RESP QL NAA+PROBE: NEGATIVE

## 2021-11-06 PROCEDURE — U0002 COVID-19 LAB TEST NON-CDC: HCPCS | Performed by: EMERGENCY MEDICINE

## 2021-11-06 PROCEDURE — 99284 EMERGENCY DEPT VISIT MOD MDM: CPT | Mod: 25

## 2021-11-06 PROCEDURE — 81025 URINE PREGNANCY TEST: CPT | Performed by: EMERGENCY MEDICINE

## 2021-11-06 PROCEDURE — 25000003 PHARM REV CODE 250: Performed by: PHYSICIAN ASSISTANT

## 2021-11-06 RX ORDER — IBUPROFEN 600 MG/1
600 TABLET ORAL EVERY 6 HOURS PRN
Qty: 15 TABLET | Refills: 0 | OUTPATIENT
Start: 2021-11-06 | End: 2022-12-21

## 2021-11-06 RX ORDER — BENZONATATE 100 MG/1
100 CAPSULE ORAL 3 TIMES DAILY PRN
Qty: 20 CAPSULE | Refills: 0 | Status: SHIPPED | OUTPATIENT
Start: 2021-11-06 | End: 2021-11-16

## 2021-11-06 RX ORDER — IBUPROFEN 600 MG/1
600 TABLET ORAL
Status: COMPLETED | OUTPATIENT
Start: 2021-11-06 | End: 2021-11-06

## 2021-11-06 RX ADMIN — IBUPROFEN 600 MG: 600 TABLET ORAL at 12:11

## 2021-11-08 ENCOUNTER — NURSE TRIAGE (OUTPATIENT)
Dept: ADMINISTRATIVE | Facility: CLINIC | Age: 51
End: 2021-11-08
Payer: MEDICAID

## 2022-02-07 ENCOUNTER — OFFICE VISIT (OUTPATIENT)
Dept: PSYCHIATRY | Facility: CLINIC | Age: 52
End: 2022-02-07
Payer: MEDICAID

## 2022-02-07 VITALS
DIASTOLIC BLOOD PRESSURE: 73 MMHG | HEART RATE: 77 BPM | SYSTOLIC BLOOD PRESSURE: 147 MMHG | WEIGHT: 174.38 LBS | BODY MASS INDEX: 29.05 KG/M2 | HEIGHT: 65 IN

## 2022-02-07 DIAGNOSIS — F41.1 GAD (GENERALIZED ANXIETY DISORDER): ICD-10-CM

## 2022-02-07 DIAGNOSIS — F43.10 PTSD (POST-TRAUMATIC STRESS DISORDER): Primary | ICD-10-CM

## 2022-02-07 DIAGNOSIS — F09 MILD COGNITIVE DISORDER: ICD-10-CM

## 2022-02-07 DIAGNOSIS — F32.1 CURRENT MODERATE EPISODE OF MAJOR DEPRESSIVE DISORDER, UNSPECIFIED WHETHER RECURRENT: ICD-10-CM

## 2022-02-07 PROCEDURE — 99999 PR PBB SHADOW E&M-EST. PATIENT-LVL III: CPT | Mod: PBBFAC,AF,HB, | Performed by: PSYCHIATRY & NEUROLOGY

## 2022-02-07 PROCEDURE — 3078F PR MOST RECENT DIASTOLIC BLOOD PRESSURE < 80 MM HG: ICD-10-PCS | Mod: AF,HB,CPTII, | Performed by: PSYCHIATRY & NEUROLOGY

## 2022-02-07 PROCEDURE — 1160F PR REVIEW ALL MEDS BY PRESCRIBER/CLIN PHARMACIST DOCUMENTED: ICD-10-PCS | Mod: AF,HB,CPTII, | Performed by: PSYCHIATRY & NEUROLOGY

## 2022-02-07 PROCEDURE — 4010F PR ACE/ARB THEARPY RXD/TAKEN: ICD-10-PCS | Mod: AF,HB,CPTII, | Performed by: PSYCHIATRY & NEUROLOGY

## 2022-02-07 PROCEDURE — 1160F RVW MEDS BY RX/DR IN RCRD: CPT | Mod: AF,HB,CPTII, | Performed by: PSYCHIATRY & NEUROLOGY

## 2022-02-07 PROCEDURE — 3077F SYST BP >= 140 MM HG: CPT | Mod: AF,HB,CPTII, | Performed by: PSYCHIATRY & NEUROLOGY

## 2022-02-07 PROCEDURE — 1159F MED LIST DOCD IN RCRD: CPT | Mod: AF,HB,CPTII, | Performed by: PSYCHIATRY & NEUROLOGY

## 2022-02-07 PROCEDURE — 4010F ACE/ARB THERAPY RXD/TAKEN: CPT | Mod: AF,HB,CPTII, | Performed by: PSYCHIATRY & NEUROLOGY

## 2022-02-07 PROCEDURE — 90792 PSYCH DIAG EVAL W/MED SRVCS: CPT | Mod: S$PBB,AF,HB, | Performed by: PSYCHIATRY & NEUROLOGY

## 2022-02-07 PROCEDURE — 99213 OFFICE O/P EST LOW 20 MIN: CPT | Mod: PBBFAC | Performed by: PSYCHIATRY & NEUROLOGY

## 2022-02-07 PROCEDURE — 90792 PR PSYCHIATRIC DIAGNOSTIC EVALUATION W/MEDICAL SERVICES: ICD-10-PCS | Mod: S$PBB,AF,HB, | Performed by: PSYCHIATRY & NEUROLOGY

## 2022-02-07 PROCEDURE — 3078F DIAST BP <80 MM HG: CPT | Mod: AF,HB,CPTII, | Performed by: PSYCHIATRY & NEUROLOGY

## 2022-02-07 PROCEDURE — 1159F PR MEDICATION LIST DOCUMENTED IN MEDICAL RECORD: ICD-10-PCS | Mod: AF,HB,CPTII, | Performed by: PSYCHIATRY & NEUROLOGY

## 2022-02-07 PROCEDURE — 3008F PR BODY MASS INDEX (BMI) DOCUMENTED: ICD-10-PCS | Mod: AF,HB,CPTII, | Performed by: PSYCHIATRY & NEUROLOGY

## 2022-02-07 PROCEDURE — 3008F BODY MASS INDEX DOCD: CPT | Mod: AF,HB,CPTII, | Performed by: PSYCHIATRY & NEUROLOGY

## 2022-02-07 PROCEDURE — 99999 PR PBB SHADOW E&M-EST. PATIENT-LVL III: ICD-10-PCS | Mod: PBBFAC,AF,HB, | Performed by: PSYCHIATRY & NEUROLOGY

## 2022-02-07 PROCEDURE — 3077F PR MOST RECENT SYSTOLIC BLOOD PRESSURE >= 140 MM HG: ICD-10-PCS | Mod: AF,HB,CPTII, | Performed by: PSYCHIATRY & NEUROLOGY

## 2022-02-07 RX ORDER — BUTALBITAL, ACETAMINOPHEN AND CAFFEINE 50; 325; 40 MG/1; MG/1; MG/1
1 TABLET ORAL EVERY 4 HOURS PRN
COMMUNITY
End: 2023-04-26

## 2022-02-07 RX ORDER — CELECOXIB 200 MG/1
200 CAPSULE ORAL
COMMUNITY
End: 2023-11-01

## 2022-02-07 RX ORDER — RIZATRIPTAN BENZOATE 10 MG/1
10 TABLET ORAL
COMMUNITY
End: 2023-04-26

## 2022-02-07 RX ORDER — TIZANIDINE HYDROCHLORIDE 4 MG/1
4 CAPSULE, GELATIN COATED ORAL 3 TIMES DAILY
COMMUNITY
End: 2023-11-01

## 2022-02-07 RX ORDER — CETIRIZINE HYDROCHLORIDE 10 MG/1
10 TABLET, CHEWABLE ORAL DAILY
COMMUNITY

## 2022-02-07 RX ORDER — QUETIAPINE FUMARATE 100 MG/1
100 TABLET, FILM COATED ORAL
COMMUNITY
End: 2022-02-14 | Stop reason: SDUPTHER

## 2022-02-07 RX ORDER — PROMETHAZINE HYDROCHLORIDE AND PHENYLEPHRINE HYDROCHLORIDE 6.25; 5 MG/5ML; MG/5ML
1.25 SYRUP ORAL EVERY 6 HOURS PRN
COMMUNITY

## 2022-02-07 RX ORDER — MELOXICAM 15 MG/1
15 TABLET ORAL DAILY
COMMUNITY
End: 2023-11-01

## 2022-02-07 RX ORDER — PRAZOSIN HYDROCHLORIDE 1 MG/1
1 CAPSULE ORAL NIGHTLY
Qty: 30 CAPSULE | Refills: 4 | Status: SHIPPED | OUTPATIENT
Start: 2022-02-07 | End: 2022-05-18 | Stop reason: SDUPTHER

## 2022-02-07 RX ORDER — DULOXETIN HYDROCHLORIDE 60 MG/1
60 CAPSULE, DELAYED RELEASE ORAL DAILY
COMMUNITY
End: 2022-02-07

## 2022-02-07 RX ORDER — TRAZODONE HYDROCHLORIDE 100 MG/1
100 TABLET ORAL NIGHTLY
COMMUNITY
End: 2022-02-07

## 2022-02-07 NOTE — PATIENT INSTRUCTIONS
1. Stop Wellbutrin XL 300mg. Stop Ativan.  2. Continue seroquel 100mg nightly.  3. Add prazosin 1mg nightly.  4. Recommend a sleep study and a cardiology check up.  5. Ask Dr. Scruggs about decreasing gabapentin.  6. Ask heme-onc Dr. Amaya about adding TSH, free T4    Follow up in one week.
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
## 2022-02-11 ENCOUNTER — PATIENT MESSAGE (OUTPATIENT)
Dept: PSYCHIATRY | Facility: CLINIC | Age: 52
End: 2022-02-11
Payer: MEDICAID

## 2022-02-14 ENCOUNTER — PATIENT MESSAGE (OUTPATIENT)
Dept: PSYCHIATRY | Facility: CLINIC | Age: 52
End: 2022-02-14
Payer: MEDICAID

## 2022-02-14 RX ORDER — QUETIAPINE FUMARATE 100 MG/1
100 TABLET, FILM COATED ORAL NIGHTLY
Qty: 30 TABLET | Refills: 4 | Status: SHIPPED | OUTPATIENT
Start: 2022-02-14 | End: 2022-03-11 | Stop reason: SDUPTHER

## 2022-02-17 PROBLEM — F32.A DEPRESSION: Status: ACTIVE | Noted: 2022-02-17

## 2022-02-17 PROBLEM — F32.1 CURRENT MODERATE EPISODE OF MAJOR DEPRESSIVE DISORDER: Status: ACTIVE | Noted: 2022-02-17

## 2022-02-17 PROBLEM — F43.10 PTSD (POST-TRAUMATIC STRESS DISORDER): Status: ACTIVE | Noted: 2022-02-17

## 2022-03-11 RX ORDER — QUETIAPINE FUMARATE 50 MG/1
150 TABLET, FILM COATED ORAL NIGHTLY
Qty: 90 TABLET | Refills: 1 | Status: SHIPPED | OUTPATIENT
Start: 2022-03-11 | End: 2022-05-18 | Stop reason: SDUPTHER

## 2022-03-11 NOTE — TELEPHONE ENCOUNTER
Requested Prescriptions     Pending Prescriptions Disp Refills    QUEtiapine (SEROQUEL) 50 MG tablet 90 tablet 1     Sig: Take 3 tablets (150 mg total) by mouth every evening.      Covering for Dr. Mckeon  Pts chart reviewed and noted recent recommendations by the provider.  Pt c/o persistent insomnia per MA message Will recommend to increase Seroquel to 150mg qhs  Will refill the above medication request for 2 months only.  Pt instructed to follow up with another outpt provider and also with Sleep medicine for her insomnia.    NAZANIN ARREAGA MD   Department of Psychiatry   [facility]  3/11/2022 3:38 PM

## 2022-04-11 ENCOUNTER — TELEPHONE (OUTPATIENT)
Dept: PSYCHIATRY | Facility: CLINIC | Age: 52
End: 2022-04-11
Payer: MEDICAID

## 2022-05-18 DIAGNOSIS — F32.1 CURRENT MODERATE EPISODE OF MAJOR DEPRESSIVE DISORDER, UNSPECIFIED WHETHER RECURRENT: Primary | ICD-10-CM

## 2022-05-18 DIAGNOSIS — F43.10 PTSD (POST-TRAUMATIC STRESS DISORDER): ICD-10-CM

## 2022-05-18 RX ORDER — QUETIAPINE FUMARATE 50 MG/1
150 TABLET, FILM COATED ORAL NIGHTLY
Qty: 90 TABLET | Refills: 1 | Status: SHIPPED | OUTPATIENT
Start: 2022-05-18 | End: 2022-07-16 | Stop reason: SDUPTHER

## 2022-05-18 RX ORDER — PRAZOSIN HYDROCHLORIDE 1 MG/1
1 CAPSULE ORAL NIGHTLY
Qty: 30 CAPSULE | Refills: 4 | Status: SHIPPED | OUTPATIENT
Start: 2022-05-18 | End: 2022-10-05 | Stop reason: SDUPTHER

## 2022-05-20 ENCOUNTER — PATIENT MESSAGE (OUTPATIENT)
Dept: PSYCHIATRY | Facility: CLINIC | Age: 52
End: 2022-05-20
Payer: MEDICAID

## 2022-05-23 ENCOUNTER — OFFICE VISIT (OUTPATIENT)
Dept: PSYCHIATRY | Facility: CLINIC | Age: 52
End: 2022-05-23
Payer: MEDICAID

## 2022-05-23 VITALS
WEIGHT: 174 LBS | BODY MASS INDEX: 28.96 KG/M2 | SYSTOLIC BLOOD PRESSURE: 176 MMHG | HEART RATE: 70 BPM | DIASTOLIC BLOOD PRESSURE: 94 MMHG

## 2022-05-23 DIAGNOSIS — F09 MILD COGNITIVE DISORDER: ICD-10-CM

## 2022-05-23 DIAGNOSIS — F41.1 GAD (GENERALIZED ANXIETY DISORDER): ICD-10-CM

## 2022-05-23 DIAGNOSIS — F32.1 CURRENT MODERATE EPISODE OF MAJOR DEPRESSIVE DISORDER, UNSPECIFIED WHETHER RECURRENT: ICD-10-CM

## 2022-05-23 DIAGNOSIS — F43.10 PTSD (POST-TRAUMATIC STRESS DISORDER): Primary | ICD-10-CM

## 2022-05-23 PROCEDURE — 1159F PR MEDICATION LIST DOCUMENTED IN MEDICAL RECORD: ICD-10-PCS | Mod: AF,HB,CPTII,95 | Performed by: PSYCHIATRY & NEUROLOGY

## 2022-05-23 PROCEDURE — 4010F PR ACE/ARB THEARPY RXD/TAKEN: ICD-10-PCS | Mod: AF,HB,CPTII,95 | Performed by: PSYCHIATRY & NEUROLOGY

## 2022-05-23 PROCEDURE — 1160F RVW MEDS BY RX/DR IN RCRD: CPT | Mod: AF,HB,CPTII,95 | Performed by: PSYCHIATRY & NEUROLOGY

## 2022-05-23 PROCEDURE — 3008F PR BODY MASS INDEX (BMI) DOCUMENTED: ICD-10-PCS | Mod: AF,HB,CPTII,95 | Performed by: PSYCHIATRY & NEUROLOGY

## 2022-05-23 PROCEDURE — 1160F PR REVIEW ALL MEDS BY PRESCRIBER/CLIN PHARMACIST DOCUMENTED: ICD-10-PCS | Mod: AF,HB,CPTII,95 | Performed by: PSYCHIATRY & NEUROLOGY

## 2022-05-23 PROCEDURE — 1159F MED LIST DOCD IN RCRD: CPT | Mod: AF,HB,CPTII,95 | Performed by: PSYCHIATRY & NEUROLOGY

## 2022-05-23 PROCEDURE — 3080F DIAST BP >= 90 MM HG: CPT | Mod: AF,HB,CPTII,95 | Performed by: PSYCHIATRY & NEUROLOGY

## 2022-05-23 PROCEDURE — 3008F BODY MASS INDEX DOCD: CPT | Mod: AF,HB,CPTII,95 | Performed by: PSYCHIATRY & NEUROLOGY

## 2022-05-23 PROCEDURE — 3077F PR MOST RECENT SYSTOLIC BLOOD PRESSURE >= 140 MM HG: ICD-10-PCS | Mod: AF,HB,CPTII,95 | Performed by: PSYCHIATRY & NEUROLOGY

## 2022-05-23 PROCEDURE — 3080F PR MOST RECENT DIASTOLIC BLOOD PRESSURE >= 90 MM HG: ICD-10-PCS | Mod: AF,HB,CPTII,95 | Performed by: PSYCHIATRY & NEUROLOGY

## 2022-05-23 PROCEDURE — 3077F SYST BP >= 140 MM HG: CPT | Mod: AF,HB,CPTII,95 | Performed by: PSYCHIATRY & NEUROLOGY

## 2022-05-23 PROCEDURE — 4010F ACE/ARB THERAPY RXD/TAKEN: CPT | Mod: AF,HB,CPTII,95 | Performed by: PSYCHIATRY & NEUROLOGY

## 2022-05-23 PROCEDURE — 90792 PR PSYCHIATRIC DIAGNOSTIC EVALUATION W/MEDICAL SERVICES: ICD-10-PCS | Mod: AF,HB,95, | Performed by: PSYCHIATRY & NEUROLOGY

## 2022-05-23 PROCEDURE — 90792 PSYCH DIAG EVAL W/MED SRVCS: CPT | Mod: AF,HB,95, | Performed by: PSYCHIATRY & NEUROLOGY

## 2022-05-23 RX ORDER — SERTRALINE HYDROCHLORIDE 25 MG/1
25 TABLET, FILM COATED ORAL DAILY
Qty: 30 TABLET | Refills: 2 | Status: SHIPPED | OUTPATIENT
Start: 2022-05-23 | End: 2022-08-01

## 2022-05-23 NOTE — PROGRESS NOTES
Subsequent Psychiatric Session-VIRTUAL    52 y.o. female    Reason for Follow Up:   PTSD, MDD, and MARQUITA, with a rule out of mild cognitive disorder    Current Medications:    Current Outpatient Medications:     albuterol (PROVENTIL/VENTOLIN HFA) 90 mcg/actuation inhaler, Inhale 2 puffs into the lungs every 6 (six) hours as needed for Wheezing. , Disp: , Rfl:     ascorbic acid, vitamin C, (VITAMIN C) 500 MG tablet, Take 1 tablet (500 mg total) by mouth once daily., Disp: , Rfl:     celecoxib (CELEBREX) 200 MG capsule, Take 200 mg by mouth., Disp: , Rfl:     cetirizine 10 mg chewable tablet, Take 10 mg by mouth once daily., Disp: , Rfl:     fluticasone furoate-vilanteroL (BREO) 100-25 mcg/dose diskus inhaler, Inhale 1 puff into the lungs once daily. , Disp: , Rfl:     gabapentin (NEURONTIN) 300 MG capsule, Take 400 mg by mouth every evening., Disp: , Rfl:     ibuprofen (ADVIL,MOTRIN) 600 MG tablet, Take 1 tablet (600 mg total) by mouth every 6 (six) hours as needed for Pain., Disp: 15 tablet, Rfl: 0    losartan-hydrochlorothiazide 100-25 mg (HYZAAR) 100-25 mg per tablet, Take 1 tablet by mouth once daily., Disp: 90 tablet, Rfl: 0    meloxicam (MOBIC) 15 MG tablet, Take 15 mg by mouth once daily., Disp: , Rfl:     prazosin (MINIPRESS) 1 MG Cap, Take 1 capsule (1 mg total) by mouth every evening., Disp: 30 capsule, Rfl: 4    promethazine-phenylephrine (PROMETHAZINE VC) 6.25-5 mg/5 mL syrup, Take 1.25 mLs by mouth every 6 (six) hours as needed., Disp: , Rfl:     QUEtiapine (SEROQUEL) 50 MG tablet, Take 3 tablets (150 mg total) by mouth every evening., Disp: 90 tablet, Rfl: 1    rizatriptan (MAXALT) 10 MG tablet, Take 10 mg by mouth as needed for Migraine., Disp: , Rfl:     tiZANidine 4 mg Cap, Take 4 mg by mouth 3 (three) times daily., Disp: , Rfl:     butalbital-acetaminophen-caffeine -40 mg (FIORICET, ESGIC) -40 mg per tablet, Take 1 tablet by mouth every 4 (four) hours as needed for Pain.,  "Disp: , Rfl:     famotidine (PEPCID) 20 MG tablet, Take 20 mg by mouth 2 (two) times daily as needed. , Disp: , Rfl:     sertraline (ZOLOFT) 25 MG tablet, Take 1 tablet (25 mg total) by mouth once daily., Disp: 30 tablet, Rfl: 2     Date of Last Visit:   02/05/22    In Clinic Since:   February 2022  Therapy:    None    Interval History  Patient's complaints include "not enough sleep," nighttime awakenings, nightmares, hypervigilance, intrusive thoughts, worsening panic attacks, restlessness, poor concentration, isolation, low energy, bone pain in bilateral knees and R leg pain that wake her up from sleep. She is frustrated that she does not have the money to pay for medical marijuana. She is also annoyed with the delay in getting her CPAP machine prescribed, because her sleep study showed AMRIT. She says she has had problems with Dr. Bruner's nurse before. She has a new puppy named Kelli. Denies medication side effects. Denies SI, HI, AVH.     Her gabapentin has been decreased to 300mg nightly. She has not been needing Maxalt. When one comes on she takes half a tablet. She still thinks she needs the seroquel for insomnia.     Manic/Hypomanic Symptom Screening:  Since the last session, have you had any periods lasting at least a few days where your energy level was higher than normal and you did not need as much sleep at night to function the following day?: Denies    Substance Use Screening:  Alcohol: not asked this session  Cannabis: cannot afford medical marijuana at the moment  Anything recreational that is not prescribed: not asked this session    Review of Measures  PHQ-9: 23  MARQUITA-7: 21    Scores from last session:   PHQ9 Score:              21/27  GAD7 Score:              21/21     Review of PDMP  Reviewed    Constitutional     VITAL SIGNS  Temp     BP (!) 176/94    HR 70    RR      SpO2           No results found for this or any previous visit (from the past 672 hour(s)).     MENTAL STATUS EXAM  General:  " Alert and oriented x 4 Appearance is good grooming and hygiene, appears stated age, BMI 28.96, wearing a mask while at home on virtual visit. Behavior: cooperative, eye contact normal, distractible  Gait, Posture and Muscle Strength/Tone: Normal posture observed. No gross abnormal movements noted.  Psychomotor Agitation and Retardation: none evident  Speech: Normal rate, volume, articulation, and tone.  Mood: +depression  Affect: dysthymic  Thought Process: Needs redirection back to question. Some tangentiality. No flight of ideas.  Associations:  Intact. No loosening of associations.  Thought content: Denies suicidal and homicidal thoughts, plans and intentions.  Perceptions: Denies any auditory or visual hallucinations at present. Does not appear internally preoccupied.  Orientation: Alert and oriented to person, place, date and situation  Attention and Concentration: Intact.   Memory: Has trouble with remembering the start and stop dates of medications  Language: No deficits noted    Fund of Knowledge: appropriate for age and level and education.   Insight:   good  Judgment: good  Impulse control: good      ASSESSMENT  Problem List Items Addressed This Visit        Neuro    Mild cognitive disorder, rule out       Psychiatric    PTSD (post-traumatic stress disorder) - Primary    Relevant Medications    sertraline (ZOLOFT) 25 MG tablet    Current moderate episode of major depressive disorder    Relevant Medications    sertraline (ZOLOFT) 25 MG tablet    MARQUITA (generalized anxiety disorder)    Relevant Medications    sertraline (ZOLOFT) 25 MG tablet        She says her blood pressure is elevated due to frustration with PCP's office staff.     PLAN  Patient Instructions   1. Continue seroquel 100mg nightly.  2. Start Zoloft 25mg in the morning.   3. Continue prazosin 1mg nightly.  4. Ask Dr. Chinchilla about CPAP machine at appointment on June 6th  5. Make appointment with heme-onc Dr. Amaya. Labs TSH, free T4  6.  Find cardiologist.     Follow up in one week.     Psychotherapy:     [facility] [place]   Call to make an appointment within [facility] for [facility]/psychology [phone].     [facility]   [email or web address]   [email or web address]   Options for Behavioral Health.     [facility]   [facility]: [phone]       CBT [facility]   Cognitive Behavioral Therapy (CBT) [facility]   Address: [facility] [place] [address]   Phone: [phone]   [email or web address]       Integrated Behavioral Health [facility]   Address:  [address] [place] [address]   Phone: [phone]   You can email for an appointment at: [email or web address]       [facility]   Address: [address] [place] [address], [place] [address], [place] [address]   [email or web address]   Dr. Nydia Garner, [phone] or [email or web address]   Dr. Kimberley Banks, [phone] or [email or web address]     DBT   DBT [facility] [email or web address]   DBT [facility] [email or web address]     [facility]   [email or web address]     [facility]   [email or web address]     [facility]   [email or web address]     PsychologyToday   [email or web address]/us - Search for a therapist in your area based on your insurance coverage and provider expertise.       Employee Assistance Program (EAP)   Check through your employer's HR.       Online Therapist:     [email or web address]://[email or web address]/health/mental-health/online-psychiatrist#A-quick-look-at-the-top-awaacm-qxbzmvangf-ihfmbcau-in-2021     Free Guided Meditations   [email or web address]/audio   [email or web address]   Https://health.[facility]/specialties/mindfulness/programs/mbsr/pages/audio.aspx       Crisis Support:     If you are  having an emergency, please call 911 or visit your nearest Emergency Department.     Local Hotlines:   [place] Crisis Line   (796) 187-KJVH   Free, confidential crisis counseling, suicide prevention, and information and referral services 24/7.     [facility] Crisis Response Team   [phone]   Team is available 24/7 in [facility] and [facility].     [facility] Human Services Authority   [phone] or [phone]   Crisis line for [facility].     Crisis Transportation Service (NOPD-CTS)   Call 911 and ask for a crisis unit.  There are two teams available (between 12 pm - 12 am) to provide crisis intervention and transportation to local hospitals.  This service is offered jointly by [facility] and the Office of Health and Hospitals.     [facility]   [email or web address]   Options for Urgent Care.     National Hotlines:   National Suicide Prevention Lifeline   (998) 429-2134   Free and confidential 24/7 support, information, and resources.     [place]   (540) 409-EOHG   This line can be reached Monday through Friday, 9 am to 5 pm CST.     Crisis Call Center   [phone]   Call the Crisis Call Center or text CARE to 805632 for 24/7/365 crisis support.     Suicide.org   (928) 644-1043   Call the hotline to receive 24/7 crisis support and to learn about resources in your area.     Crisis Text Line (volunteer help)   Text HOME to 457476   Crisis counselors are trained volunteers and not professionals, and can provide support but not medical advice.          -------------------------------------------------------------------------------    Joanne Mckeon  [facility]    Today's encounter took a total time of 30 minutes, and that time included interview and documentation, ordering medication.    Due to the pandemic, today's session was performed over Vidy. Patient is confirmed to be in the [facility]. The participants are patient and  myself.    Risks, Benefits, Side Effects and Alternatives were discussed with the patient today and consent was obtained for the current medication regimen. The patient was encouraged to ask questions and these questions were answered and the patient was engaged in psychoeducation regarding diagnosis, course of illness, accuracy of the diagnosis, and other general elements regarding overall diagnosis and treatment consideration.     Any medications being used off-label were discussed with the patient inclusive of the evidence base for the use of the medications and consent was obtained for the off-label use of the medication.     Brief suicide risk assessment was performed with the patient today and safety planning was performed if indicated.    Note completed by: Joanne Mckeon MD, 5/26/2022 3:41 PM

## 2022-05-23 NOTE — PATIENT INSTRUCTIONS
1. Continue seroquel 100mg nightly.  2. Start Zoloft 25mg in the morning.   3. Continue prazosin 1mg nightly.  4. Ask Dr. Chinchilla about CPAP machine at appointment on June 6th  5. Make appointment with heme-onc Dr. Amaya. Labs TSH, free T4  6. Find cardiologist.     Follow up in one week.     Psychotherapy:     [facility] ([facility])   Call to make an appointment within [facility] for psychiatry/psychology [phone].     [facility]   [email or web address]   [email or web address]   Options for Behavioral Health.     [facility]   [facility]: [phone]       [facility] [place]   Cognitive Behavioral Therapy (CBT) [facility]   Address: [facility] "[employer] [place] [address]   Phone: [phone]   [email or web address]       Integrated [facility]al [facility]   Address:  [address] [place] [address]   Phone: [phone]   You can email for an appointment at: [email or web address]       [facility]   Address: [address] [place] [address], [address], [facility]   Https://[email or web address]/   Dr. Nydia Garner, [phone] or [email or web address]   Dr. Kimberley Banks, [phone] or [email or web address]     DBT   [facility] [place] [email or web address][email or web address]/   [facility] [email or web address]     [facility]   [email or web address]     [facility]   [email or web address]     [facility]   [email or web address]     [facility]   https://www.[email or web address].com/us - Search for a therapist in your area based on your insurance coverage and provider expertise.       Employee Assistance Program (EAP)   Check through your employer's HR.       Online Therapist:     https://www.[employer]/health/mental-health/online-psychiatrist#A-quick-look-at-the-top-aoxcon-hyfzhoemkx-xheefglx-in-2021      Free Guided Meditations   Https://[employer]/audio   Https://www.[facility].org/koffi/body.cfm?[email or web address]&iirf_redirect=[email or web address]   Https://health.[facility]/specialties/mindfulness/programs/mbsr/pages/audio.aspx       Crisis Support:     If you are having an emergency, please call 911 or visit your nearest Emergency Department.     Local Hotlines:   [place] Crisis Line   (499) 740-VTTL   Free, confidential crisis counseling, suicide prevention, and information and referral services 24/7.     [facility] Crisis Response Team   [phone]   Team is available 24/7 in [facility].     [facility] Human Services Authority   [phone] or [phone]   Crisis line for [facility].     Crisis Transportation Service (NOPD-CTS)   Call 911 and ask for a crisis unit.  There are two teams available (between 12 pm - 12 am) to provide crisis intervention and transportation to local hospitals.  This service is offered jointly by [facility] and the Office of Health and Hospitals.     [facility]   [email or web address]   Options for Urgent Care.     National Hotlines:   National Suicide Prevention Lifeline   (742) 550-3909   Free and confidential 24/7 support, information, and resources.     [place]   (265) 470-JQZE   This line can be reached Monday through Friday, 9 am to 5 pm CST.     Crisis Call Center   [phone]   Call the Crisis Call Center or text CARE to 685608 for 24/7/365 crisis support.     Suicide.org   (661) 238-1935   Call the hotline to receive 24/7 crisis support and to learn about resources in your area.     Crisis Text Line (volunteer help)   Text HOME to 884879   Crisis counselors are trained volunteers and not professionals, and can provide support but not medical advice.

## 2022-07-16 ENCOUNTER — PATIENT MESSAGE (OUTPATIENT)
Dept: PSYCHIATRY | Facility: CLINIC | Age: 52
End: 2022-07-16
Payer: MEDICAID

## 2022-07-19 ENCOUNTER — TELEPHONE (OUTPATIENT)
Dept: PSYCHIATRY | Facility: CLINIC | Age: 52
End: 2022-07-19
Payer: MEDICAID

## 2022-07-19 NOTE — TELEPHONE ENCOUNTER
----- Message from Maribell Hernandez MA sent at 7/19/2022  1:19 PM CDT -----  Regarding: pt requesting refill  Hello!  Pt is a requesting a refill on her 50 mg seroquel. She has an appt set up on 8/2 for when Dr Mckeon returns from her vacation.     Maribell :)

## 2022-08-01 ENCOUNTER — PATIENT MESSAGE (OUTPATIENT)
Dept: PSYCHIATRY | Facility: CLINIC | Age: 52
End: 2022-08-01

## 2022-08-01 ENCOUNTER — OFFICE VISIT (OUTPATIENT)
Dept: PSYCHIATRY | Facility: CLINIC | Age: 52
End: 2022-08-01
Payer: MEDICAID

## 2022-08-01 VITALS
DIASTOLIC BLOOD PRESSURE: 80 MMHG | WEIGHT: 176 LBS | HEART RATE: 105 BPM | BODY MASS INDEX: 29.29 KG/M2 | SYSTOLIC BLOOD PRESSURE: 179 MMHG

## 2022-08-01 DIAGNOSIS — F41.1 GAD (GENERALIZED ANXIETY DISORDER): ICD-10-CM

## 2022-08-01 DIAGNOSIS — F09 MILD COGNITIVE DISORDER: ICD-10-CM

## 2022-08-01 DIAGNOSIS — F32.1 CURRENT MODERATE EPISODE OF MAJOR DEPRESSIVE DISORDER, UNSPECIFIED WHETHER RECURRENT: ICD-10-CM

## 2022-08-01 DIAGNOSIS — F43.10 PTSD (POST-TRAUMATIC STRESS DISORDER): Primary | ICD-10-CM

## 2022-08-01 PROCEDURE — 3079F PR MOST RECENT DIASTOLIC BLOOD PRESSURE 80-89 MM HG: ICD-10-PCS | Mod: AF,HB,CPTII,95 | Performed by: PSYCHIATRY & NEUROLOGY

## 2022-08-01 PROCEDURE — 3077F PR MOST RECENT SYSTOLIC BLOOD PRESSURE >= 140 MM HG: ICD-10-PCS | Mod: AF,HB,CPTII,95 | Performed by: PSYCHIATRY & NEUROLOGY

## 2022-08-01 PROCEDURE — 99214 OFFICE O/P EST MOD 30 MIN: CPT | Mod: AF,HB,95, | Performed by: PSYCHIATRY & NEUROLOGY

## 2022-08-01 PROCEDURE — 4010F ACE/ARB THERAPY RXD/TAKEN: CPT | Mod: AF,HB,CPTII,95 | Performed by: PSYCHIATRY & NEUROLOGY

## 2022-08-01 PROCEDURE — 3008F BODY MASS INDEX DOCD: CPT | Mod: AF,HB,CPTII,95 | Performed by: PSYCHIATRY & NEUROLOGY

## 2022-08-01 PROCEDURE — 3008F PR BODY MASS INDEX (BMI) DOCUMENTED: ICD-10-PCS | Mod: AF,HB,CPTII,95 | Performed by: PSYCHIATRY & NEUROLOGY

## 2022-08-01 PROCEDURE — 3077F SYST BP >= 140 MM HG: CPT | Mod: AF,HB,CPTII,95 | Performed by: PSYCHIATRY & NEUROLOGY

## 2022-08-01 PROCEDURE — 1160F PR REVIEW ALL MEDS BY PRESCRIBER/CLIN PHARMACIST DOCUMENTED: ICD-10-PCS | Mod: AF,HB,CPTII,95 | Performed by: PSYCHIATRY & NEUROLOGY

## 2022-08-01 PROCEDURE — 1159F MED LIST DOCD IN RCRD: CPT | Mod: AF,HB,CPTII,95 | Performed by: PSYCHIATRY & NEUROLOGY

## 2022-08-01 PROCEDURE — 1160F RVW MEDS BY RX/DR IN RCRD: CPT | Mod: AF,HB,CPTII,95 | Performed by: PSYCHIATRY & NEUROLOGY

## 2022-08-01 PROCEDURE — 99214 PR OFFICE/OUTPT VISIT, EST, LEVL IV, 30-39 MIN: ICD-10-PCS | Mod: AF,HB,95, | Performed by: PSYCHIATRY & NEUROLOGY

## 2022-08-01 PROCEDURE — 3079F DIAST BP 80-89 MM HG: CPT | Mod: AF,HB,CPTII,95 | Performed by: PSYCHIATRY & NEUROLOGY

## 2022-08-01 PROCEDURE — 4010F PR ACE/ARB THEARPY RXD/TAKEN: ICD-10-PCS | Mod: AF,HB,CPTII,95 | Performed by: PSYCHIATRY & NEUROLOGY

## 2022-08-01 PROCEDURE — 1159F PR MEDICATION LIST DOCUMENTED IN MEDICAL RECORD: ICD-10-PCS | Mod: AF,HB,CPTII,95 | Performed by: PSYCHIATRY & NEUROLOGY

## 2022-08-01 RX ORDER — SERTRALINE HYDROCHLORIDE 50 MG/1
50 TABLET, FILM COATED ORAL DAILY
Qty: 90 TABLET | Refills: 1 | Status: SHIPPED | OUTPATIENT
Start: 2022-08-01 | End: 2022-08-29

## 2022-08-01 RX ORDER — LOSARTAN POTASSIUM 25 MG/1
25 TABLET ORAL DAILY
COMMUNITY

## 2022-08-01 NOTE — PROGRESS NOTES
Subsequent Psychiatric Session-VIRTUAL    52 y.o. female    Reason for Follow Up:   PTSD, MDD, and MARQUITA, with a rule out of mild cognitive disorder    Current Medications:    Current Outpatient Medications:     ascorbic acid, vitamin C, (VITAMIN C) 500 MG tablet, Take 1 tablet (500 mg total) by mouth once daily., Disp: , Rfl:     celecoxib (CELEBREX) 200 MG capsule, Take 200 mg by mouth., Disp: , Rfl:     cetirizine 10 mg chewable tablet, Take 10 mg by mouth once daily., Disp: , Rfl:     fluticasone furoate-vilanteroL (BREO) 100-25 mcg/dose diskus inhaler, Inhale 1 puff into the lungs once daily. , Disp: , Rfl:     gabapentin (NEURONTIN) 300 MG capsule, Take 400 mg by mouth every evening., Disp: , Rfl:     ibuprofen (ADVIL,MOTRIN) 600 MG tablet, Take 1 tablet (600 mg total) by mouth every 6 (six) hours as needed for Pain., Disp: 15 tablet, Rfl: 0    losartan (COZAAR) 25 MG tablet, Take 25 mg by mouth once daily., Disp: , Rfl:     meloxicam (MOBIC) 15 MG tablet, Take 15 mg by mouth once daily., Disp: , Rfl:     prazosin (MINIPRESS) 1 MG Cap, Take 1 capsule (1 mg total) by mouth every evening., Disp: 30 capsule, Rfl: 4    promethazine-phenylephrine (PROMETHAZINE VC) 6.25-5 mg/5 mL syrup, Take 1.25 mLs by mouth every 6 (six) hours as needed., Disp: , Rfl:     QUEtiapine (SEROQUEL) 50 MG tablet, TAKE 3 TABLETS(150 MG) BY MOUTH EVERY EVENING, Disp: 90 tablet, Rfl: 1    rizatriptan (MAXALT) 10 MG tablet, Take 10 mg by mouth as needed for Migraine., Disp: , Rfl:     sertraline (ZOLOFT) 25 MG tablet, Take 1 tablet (25 mg total) by mouth once daily., Disp: 30 tablet, Rfl: 2    tiZANidine 4 mg Cap, Take 4 mg by mouth 3 (three) times daily., Disp: , Rfl:     albuterol (PROVENTIL/VENTOLIN HFA) 90 mcg/actuation inhaler, Inhale 2 puffs into the lungs every 6 (six) hours as needed for Wheezing. , Disp: , Rfl:     butalbital-acetaminophen-caffeine -40 mg (FIORICET, ESGIC) -40 mg per tablet, Take 1 tablet  "by mouth every 4 (four) hours as needed for Pain., Disp: , Rfl:     famotidine (PEPCID) 20 MG tablet, Take 20 mg by mouth 2 (two) times daily as needed. , Disp: , Rfl:     losartan-hydrochlorothiazide 100-25 mg (HYZAAR) 100-25 mg per tablet, Take 1 tablet by mouth once daily. (Patient not taking: Reported on 8/1/2022), Disp: 90 tablet, Rfl: 0     Date of Last Visit:   05/25/22    In Clinic Since:   February 2022  Therapy:    None    Interval History  She saw Dr. Bruner, who encouraged her to keep up with her other doctor's appointments. She is still on the waiting list for a CPAP machine, but she is expecting a call sometime this month. She had an iron infusion today and feels nauseated. She still has pain, but it is better with marijuana. Her doctor wants her to smoke four times per day. She does not like that it makes her feel overly sedated and has hallucinations of caterpillars or ants at night. RegardingZoloft: "I feel the effect, but I'm not feeling the full effect." She has "a little boost" with Zoloft. She is getting sleep. She has been waking up at 3am. She would like to try taking seroquel 100mg at night and taking 50mg as needed at 3am. Nightmares have not improved, but the prazosin is helping. She says she covers her face as a soothing gesture. Denies medication side effects. Denies SI, HI.    She has a puppy named Kelli    Manic/Hypomanic Symptom Screening:  Since the last session, have you had any periods lasting at least a few days where your energy level was higher than normal and you did not need as much sleep at night to function the following day?: Denies    Substance Use Screening:  Alcohol: denies  Cannabis: she is back on medical marijuana  Anything recreational that is not prescribed: denies    Review of Measures  PHQ-9: 23  MARQUITA-7: 21    Scores from last session:   PHQ-9: 23  MARQUITA-7: 21    PHQ9 Score:              21/27  GAD7 Score:              21/21     Review of " "PDMP  Reviewed    Constitutional     VITAL SIGNS  Temp     BP (!) 179/80        RR      SpO2           No results found for this or any previous visit (from the past 672 hour(s)).     MENTAL STATUS EXAM  General:  Alert and oriented x 4 Appearance is good grooming and hygiene, appears stated age, BMI 29.29, has oversize maryanne bear behind her and blankets drawn up to her face. Behavior: cooperative, eye contact normal, distractible   Gait, Posture and Muscle Strength/Tone: Normal posture observed. No gross abnormal movements noted.  Psychomotor Agitation and Retardation: none evident  Speech: Normal rate, volume, articulation, and tone.  Mood: +depression  Affect: dysthymic  Thought Process: Needs redirection back to question. Some tangentiality. No flight of ideas.  Associations:  Intact. No loosening of associations.  Thought content: Denies suicidal and homicidal thoughts, plans and intentions.  Perceptions: Denies any auditory or visual hallucinations at present. Does not appear internally preoccupied.  Orientation: Alert and oriented to person, place, date and situation  Attention and Concentration: Intact.   Memory: Has trouble with remembering the start and stop dates of medications  Language: No deficits noted    Fund of Knowledge: appropriate for age and level and education.   Insight:   good  Judgment: good  Impulse control: good      ASSESSMENT  Problem List Items Addressed This Visit        Neuro    Mild cognitive disorder, rule out       Psychiatric    PTSD (post-traumatic stress disorder) - Primary    Current moderate episode of major depressive disorder    MARQUITA (generalized anxiety disorder)         Her blood pressure is elevated again today. She says that the number can get up to 212/114. She says Dr. Bruner told her to rest and that Dr. Bruner does not want to increase the lostartan because she is afraid of the patient's pressure "bottoming out." I recommended that she seek out a second opinion " if her doctor does not think that she needs blood pressure medication.     PLAN  Patient Instructions   1. Continue seroquel 100mg nightly.  2. Increase Zoloft 25mg to 50mg in the morning.   3. Continue prazosin 1mg nightly.  4. Find cardiologist who accepts medicaid.     Follow up in four weeks.        -------------------------------------------------------------------------------    Joanne Mckeon  [facility] - PSYCHIATRY    Today's encounter took a total time of 30 minutes, and that time included interview and documentation, ordering medication.    Due to the pandemic, today's session was performed over [facility]. [place]. The participants are patient and myself.    Risks, Benefits, Side Effects and Alternatives were discussed with the patient today and consent was obtained for the current medication regimen. The patient was encouraged to ask questions and these questions were answered and the patient was engaged in psychoeducation regarding diagnosis, course of illness, accuracy of the diagnosis, and other general elements regarding overall diagnosis and treatment consideration.     Any medications being used off-label were discussed with the patient inclusive of the evidence base for the use of the medications and consent was obtained for the off-label use of the medication.     Brief suicide risk assessment was performed with the patient today and safety planning was performed if indicated.    Note completed by: Joanne Mckeon MD, 8/1/2022 3:41 PM

## 2022-08-01 NOTE — PATIENT INSTRUCTIONS
1. Continue seroquel 150mg nightly.  2. Increase Zoloft 25mg to 50mg in the morning.   3. Continue prazosin 1mg nightly.  4. Find cardiologist who accepts medicaid.     Follow up in four weeks.

## 2022-08-26 ENCOUNTER — PATIENT MESSAGE (OUTPATIENT)
Dept: PSYCHIATRY | Facility: CLINIC | Age: 52
End: 2022-08-26
Payer: MEDICAID

## 2022-08-29 ENCOUNTER — PATIENT MESSAGE (OUTPATIENT)
Dept: PSYCHIATRY | Facility: CLINIC | Age: 52
End: 2022-08-29
Payer: MEDICAID

## 2022-08-29 ENCOUNTER — OFFICE VISIT (OUTPATIENT)
Dept: PSYCHIATRY | Facility: CLINIC | Age: 52
End: 2022-08-29
Payer: MEDICAID

## 2022-08-29 VITALS — WEIGHT: 174 LBS | BODY MASS INDEX: 28.96 KG/M2 | HEART RATE: 79 BPM

## 2022-08-29 DIAGNOSIS — F43.10 PTSD (POST-TRAUMATIC STRESS DISORDER): Primary | ICD-10-CM

## 2022-08-29 DIAGNOSIS — F41.1 GAD (GENERALIZED ANXIETY DISORDER): ICD-10-CM

## 2022-08-29 DIAGNOSIS — F09 MILD COGNITIVE DISORDER: ICD-10-CM

## 2022-08-29 DIAGNOSIS — F32.1 CURRENT MODERATE EPISODE OF MAJOR DEPRESSIVE DISORDER, UNSPECIFIED WHETHER RECURRENT: ICD-10-CM

## 2022-08-29 PROCEDURE — 1159F PR MEDICATION LIST DOCUMENTED IN MEDICAL RECORD: ICD-10-PCS | Mod: AF,HB,CPTII,95 | Performed by: PSYCHIATRY & NEUROLOGY

## 2022-08-29 PROCEDURE — 4010F ACE/ARB THERAPY RXD/TAKEN: CPT | Mod: AF,HB,CPTII,95 | Performed by: PSYCHIATRY & NEUROLOGY

## 2022-08-29 PROCEDURE — 1159F MED LIST DOCD IN RCRD: CPT | Mod: AF,HB,CPTII,95 | Performed by: PSYCHIATRY & NEUROLOGY

## 2022-08-29 PROCEDURE — 99214 PR OFFICE/OUTPT VISIT, EST, LEVL IV, 30-39 MIN: ICD-10-PCS | Mod: AF,HB,95, | Performed by: PSYCHIATRY & NEUROLOGY

## 2022-08-29 PROCEDURE — 4010F PR ACE/ARB THEARPY RXD/TAKEN: ICD-10-PCS | Mod: AF,HB,CPTII,95 | Performed by: PSYCHIATRY & NEUROLOGY

## 2022-08-29 PROCEDURE — 3008F BODY MASS INDEX DOCD: CPT | Mod: AF,HB,CPTII,95 | Performed by: PSYCHIATRY & NEUROLOGY

## 2022-08-29 PROCEDURE — 99214 OFFICE O/P EST MOD 30 MIN: CPT | Mod: AF,HB,95, | Performed by: PSYCHIATRY & NEUROLOGY

## 2022-08-29 PROCEDURE — 1160F RVW MEDS BY RX/DR IN RCRD: CPT | Mod: AF,HB,CPTII,95 | Performed by: PSYCHIATRY & NEUROLOGY

## 2022-08-29 PROCEDURE — 1160F PR REVIEW ALL MEDS BY PRESCRIBER/CLIN PHARMACIST DOCUMENTED: ICD-10-PCS | Mod: AF,HB,CPTII,95 | Performed by: PSYCHIATRY & NEUROLOGY

## 2022-08-29 PROCEDURE — 3008F PR BODY MASS INDEX (BMI) DOCUMENTED: ICD-10-PCS | Mod: AF,HB,CPTII,95 | Performed by: PSYCHIATRY & NEUROLOGY

## 2022-08-29 RX ORDER — SERTRALINE HYDROCHLORIDE 100 MG/1
100 TABLET, FILM COATED ORAL DAILY
Qty: 30 TABLET | Refills: 1 | Status: SHIPPED | OUTPATIENT
Start: 2022-08-29 | End: 2022-10-05 | Stop reason: SDUPTHER

## 2022-08-29 RX ORDER — QUETIAPINE FUMARATE 200 MG/1
200 TABLET, FILM COATED ORAL NIGHTLY
Qty: 30 TABLET | Refills: 1 | Status: SHIPPED | OUTPATIENT
Start: 2022-08-29 | End: 2022-10-05 | Stop reason: SDUPTHER

## 2022-08-29 NOTE — PROGRESS NOTES
Subsequent Psychiatric Session-VIRTUAL    52 y.o. female    Reason for Follow Up:   PTSD, MDD, and MARQUITA, with a rule out of mild cognitive disorder    Current Medications:    Current Outpatient Medications:     cetirizine 10 mg chewable tablet, Take 10 mg by mouth once daily., Disp: , Rfl:     fluticasone furoate-vilanteroL (BREO) 100-25 mcg/dose diskus inhaler, Inhale 1 puff into the lungs once daily. , Disp: , Rfl:     losartan (COZAAR) 25 MG tablet, Take 25 mg by mouth once daily., Disp: , Rfl:     meloxicam (MOBIC) 15 MG tablet, Take 15 mg by mouth once daily., Disp: , Rfl:     prazosin (MINIPRESS) 1 MG Cap, Take 1 capsule (1 mg total) by mouth every evening., Disp: 30 capsule, Rfl: 4    promethazine-phenylephrine (PROMETHAZINE VC) 6.25-5 mg/5 mL syrup, Take 1.25 mLs by mouth every 6 (six) hours as needed., Disp: , Rfl:     rizatriptan (MAXALT) 10 MG tablet, Take 10 mg by mouth as needed for Migraine., Disp: , Rfl:     tiZANidine 4 mg Cap, Take 4 mg by mouth 3 (three) times daily., Disp: , Rfl:     albuterol (PROVENTIL/VENTOLIN HFA) 90 mcg/actuation inhaler, Inhale 2 puffs into the lungs every 6 (six) hours as needed for Wheezing. , Disp: , Rfl:     ascorbic acid, vitamin C, (VITAMIN C) 500 MG tablet, Take 1 tablet (500 mg total) by mouth once daily., Disp: , Rfl:     butalbital-acetaminophen-caffeine -40 mg (FIORICET, ESGIC) -40 mg per tablet, Take 1 tablet by mouth every 4 (four) hours as needed for Pain., Disp: , Rfl:     celecoxib (CELEBREX) 200 MG capsule, Take 200 mg by mouth., Disp: , Rfl:     famotidine (PEPCID) 20 MG tablet, Take 20 mg by mouth 2 (two) times daily as needed. , Disp: , Rfl:     gabapentin (NEURONTIN) 300 MG capsule, Take 400 mg by mouth every evening., Disp: , Rfl:     ibuprofen (ADVIL,MOTRIN) 600 MG tablet, Take 1 tablet (600 mg total) by mouth every 6 (six) hours as needed for Pain., Disp: 15 tablet, Rfl: 0    losartan-hydrochlorothiazide 100-25 mg (HYZAAR) 100-25 mg per  tablet, Take 1 tablet by mouth once daily. (Patient not taking: Reported on 8/1/2022), Disp: 90 tablet, Rfl: 0    QUEtiapine (SEROQUEL) 200 MG Tab, Take 1 tablet (200 mg total) by mouth nightly., Disp: 30 tablet, Rfl: 1    sertraline (ZOLOFT) 100 MG tablet, Take 1 tablet (100 mg total) by mouth once daily., Disp: 30 tablet, Rfl: 1     Date of Last Visit:   08/01/22    In Clinic Since:   February 2022  Therapy:    None    Interval History  She is having an iron infusion during this call. She feels overwhelmed because she forgot she had this visit today. She still has hallucinations of caterpillars or ants at night. She keeps waking up in the middle of the night. She continues to feel depressed. She says she covers her face as a soothing gesture. She continues to have hallucinations. Denies medication side effects. Denies SI, HI.    She has a puppy named Kelli    Manic/Hypomanic Symptom Screening:  Since the last session, have you had any periods lasting at least a few days where your energy level was higher than normal and you did not need as much sleep at night to function the following day?: Denies    Substance Use Screening:  Alcohol: denies  Cannabis: she is back on medical marijuana. She smokes four times per day  Anything recreational that is not prescribed: denies    Review of Measures  PHQ-9: 24  MARQUITA-7: 21    Scores from last session:   PHQ-9: 23  MARQUITA-7: 21    PHQ-9: 23  MARQUITA-7: 21    PHQ9 Score:              21/27  GAD7 Score:              21/21     Review of PDMP  Reviewed    Constitutional     VITAL SIGNS  Temp     BP      HR 79    RR      SpO2           No results found for this or any previous visit (from the past 672 hour(s)).     MENTAL STATUS EXAM  General:  Alert and oriented x 4 Appearance is good grooming and hygiene, appears stated age, BMI 28.96, has oversize maryanne bear behind her and blankets drawn up to her face. Behavior: cooperative, eye contact normal, distractible   Gait, Posture and Muscle  "Strength/Tone: Normal posture observed. No gross abnormal movements noted.  Psychomotor Agitation and Retardation: none evident  Speech: Normal rate, volume, articulation, and tone.  Mood: +depression  Affect: dysthymic  Thought Process: Needs redirection back to question. Some tangentiality. No flight of ideas.  Associations:  Intact. No loosening of associations.  Thought content: Denies suicidal and homicidal thoughts, plans and intentions.  Perceptions: Denies any auditory or visual hallucinations at present. Does not appear internally preoccupied.  Orientation: Alert and oriented to person, place, date and situation  Attention and Concentration: Intact.   Memory: Has trouble with remembering the start and stop dates of medications  Language: No deficits noted    Fund of Knowledge: appropriate for age and level and education.   Insight:   good  Judgment: good  Impulse control: good      ASSESSMENT  Problem List Items Addressed This Visit          Neuro    Mild cognitive disorder, rule out       Psychiatric    PTSD (post-traumatic stress disorder) - Primary    Relevant Medications    sertraline (ZOLOFT) 100 MG tablet    QUEtiapine (SEROQUEL) 200 MG Tab    Current moderate episode of major depressive disorder    Relevant Medications    sertraline (ZOLOFT) 100 MG tablet    MARQUITA (generalized anxiety disorder)    Relevant Medications    sertraline (ZOLOFT) 100 MG tablet        After going over medication options, she chooses to increase seroquel from 150mg to 200mg nightly and increase Zoloft from 50mg to 100mg daily.    Patient is not in office today, so I am unable to check her BP today. From last visit:  "Her blood pressure is elevated again today. She says that the number can get up to 212/114. She says Dr. Bruner told her to rest and that Dr. Bruner does not want to increase the lostartan because she is afraid of the patient's pressure "bottoming out." I recommended that she seek out a second opinion if her " "doctor does not think that she needs blood pressure medication."     PLAN  Patient Instructions   1. Increase seroquel from 150mg to 200mg nightly.  2. Increase Zoloft 50mg to 100mg in the morning.   3. Continue prazosin 1mg nightly.  4. Find cardiologist who accepts medicaid.     Follow up in two weeks.      -------------------------------------------------------------------------------    Joanne Mckeon  [facility] - PSYCHIATRY    Today's encounter took a total time of 30 minutes, and that time included interview and documentation, ordering medication.    Due to the pandemic, today's session was performed over [facility]. Patient is confirmed to be in the [facility]. The participants are patient and myself.    Risks, Benefits, Side Effects and Alternatives were discussed with the patient today and consent was obtained for the current medication regimen. The patient was encouraged to ask questions and these questions were answered and the patient was engaged in psychoeducation regarding diagnosis, course of illness, accuracy of the diagnosis, and other general elements regarding overall diagnosis and treatment consideration.     Any medications being used off-label were discussed with the patient inclusive of the evidence base for the use of the medications and consent was obtained for the off-label use of the medication.     Brief suicide risk assessment was performed with the patient today and safety planning was performed if indicated.    Note completed by: Joanne Mckeon MD, 8/29/2022 3:41 PM       "

## 2022-08-29 NOTE — PATIENT INSTRUCTIONS
1. Increase seroquel from 150mg to 200mg nightly.  2. Increase Zoloft 50mg to 100mg in the morning.   3. Continue prazosin 1mg nightly.  4. Find cardiologist who accepts medicaid.     Follow up in two weeks.

## 2022-10-05 ENCOUNTER — OFFICE VISIT (OUTPATIENT)
Dept: PSYCHIATRY | Facility: CLINIC | Age: 52
End: 2022-10-05
Payer: MEDICAID

## 2022-10-05 VITALS
WEIGHT: 176.38 LBS | DIASTOLIC BLOOD PRESSURE: 65 MMHG | HEART RATE: 63 BPM | BODY MASS INDEX: 29.35 KG/M2 | SYSTOLIC BLOOD PRESSURE: 133 MMHG

## 2022-10-05 DIAGNOSIS — F41.1 GAD (GENERALIZED ANXIETY DISORDER): ICD-10-CM

## 2022-10-05 DIAGNOSIS — F43.10 PTSD (POST-TRAUMATIC STRESS DISORDER): ICD-10-CM

## 2022-10-05 DIAGNOSIS — F09 COGNITIVE DISORDER: Primary | ICD-10-CM

## 2022-10-05 DIAGNOSIS — F32.1 CURRENT MODERATE EPISODE OF MAJOR DEPRESSIVE DISORDER, UNSPECIFIED WHETHER RECURRENT: ICD-10-CM

## 2022-10-05 PROCEDURE — 4010F ACE/ARB THERAPY RXD/TAKEN: CPT | Mod: AF,HB,CPTII, | Performed by: PSYCHIATRY & NEUROLOGY

## 2022-10-05 PROCEDURE — 99999 PR PBB SHADOW E&M-EST. PATIENT-LVL III: ICD-10-PCS | Mod: PBBFAC,AF,HB, | Performed by: PSYCHIATRY & NEUROLOGY

## 2022-10-05 PROCEDURE — 3075F SYST BP GE 130 - 139MM HG: CPT | Mod: AF,HB,CPTII, | Performed by: PSYCHIATRY & NEUROLOGY

## 2022-10-05 PROCEDURE — 1159F PR MEDICATION LIST DOCUMENTED IN MEDICAL RECORD: ICD-10-PCS | Mod: AF,HB,CPTII, | Performed by: PSYCHIATRY & NEUROLOGY

## 2022-10-05 PROCEDURE — 3008F BODY MASS INDEX DOCD: CPT | Mod: AF,HB,CPTII, | Performed by: PSYCHIATRY & NEUROLOGY

## 2022-10-05 PROCEDURE — 1160F PR REVIEW ALL MEDS BY PRESCRIBER/CLIN PHARMACIST DOCUMENTED: ICD-10-PCS | Mod: AF,HB,CPTII, | Performed by: PSYCHIATRY & NEUROLOGY

## 2022-10-05 PROCEDURE — 99999 PR PBB SHADOW E&M-EST. PATIENT-LVL III: CPT | Mod: PBBFAC,AF,HB, | Performed by: PSYCHIATRY & NEUROLOGY

## 2022-10-05 PROCEDURE — 99213 OFFICE O/P EST LOW 20 MIN: CPT | Mod: PBBFAC | Performed by: PSYCHIATRY & NEUROLOGY

## 2022-10-05 PROCEDURE — 3078F DIAST BP <80 MM HG: CPT | Mod: AF,HB,CPTII, | Performed by: PSYCHIATRY & NEUROLOGY

## 2022-10-05 PROCEDURE — 99214 OFFICE O/P EST MOD 30 MIN: CPT | Mod: AF,HB,S$PBB, | Performed by: PSYCHIATRY & NEUROLOGY

## 2022-10-05 PROCEDURE — 99214 PR OFFICE/OUTPT VISIT, EST, LEVL IV, 30-39 MIN: ICD-10-PCS | Mod: AF,HB,S$PBB, | Performed by: PSYCHIATRY & NEUROLOGY

## 2022-10-05 PROCEDURE — 4010F PR ACE/ARB THEARPY RXD/TAKEN: ICD-10-PCS | Mod: AF,HB,CPTII, | Performed by: PSYCHIATRY & NEUROLOGY

## 2022-10-05 PROCEDURE — 3078F PR MOST RECENT DIASTOLIC BLOOD PRESSURE < 80 MM HG: ICD-10-PCS | Mod: AF,HB,CPTII, | Performed by: PSYCHIATRY & NEUROLOGY

## 2022-10-05 PROCEDURE — 3008F PR BODY MASS INDEX (BMI) DOCUMENTED: ICD-10-PCS | Mod: AF,HB,CPTII, | Performed by: PSYCHIATRY & NEUROLOGY

## 2022-10-05 PROCEDURE — 1159F MED LIST DOCD IN RCRD: CPT | Mod: AF,HB,CPTII, | Performed by: PSYCHIATRY & NEUROLOGY

## 2022-10-05 PROCEDURE — 3075F PR MOST RECENT SYSTOLIC BLOOD PRESS GE 130-139MM HG: ICD-10-PCS | Mod: AF,HB,CPTII, | Performed by: PSYCHIATRY & NEUROLOGY

## 2022-10-05 PROCEDURE — 1160F RVW MEDS BY RX/DR IN RCRD: CPT | Mod: AF,HB,CPTII, | Performed by: PSYCHIATRY & NEUROLOGY

## 2022-10-05 RX ORDER — PRAZOSIN HYDROCHLORIDE 1 MG/1
1 CAPSULE ORAL NIGHTLY
Qty: 30 CAPSULE | Refills: 2 | Status: SHIPPED | OUTPATIENT
Start: 2022-10-05 | End: 2022-11-07

## 2022-10-05 RX ORDER — QUETIAPINE FUMARATE 200 MG/1
200 TABLET, FILM COATED ORAL NIGHTLY
Qty: 30 TABLET | Refills: 2 | Status: SHIPPED | OUTPATIENT
Start: 2022-10-05 | End: 2023-01-27 | Stop reason: SDUPTHER

## 2022-10-05 RX ORDER — SERTRALINE HYDROCHLORIDE 100 MG/1
100 TABLET, FILM COATED ORAL DAILY
Qty: 30 TABLET | Refills: 2 | Status: SHIPPED | OUTPATIENT
Start: 2022-10-05 | End: 2023-03-07

## 2022-10-05 NOTE — PATIENT INSTRUCTIONS
1. Continue seroquel from 200mg nightly.  2. Continue Zoloft 100mg in the morning.   3. Continue prazosin 1mg nightly.  4. Find cardiologist who accepts medicaid.     Follow up in 4 weeks
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
## 2022-11-04 ENCOUNTER — PATIENT MESSAGE (OUTPATIENT)
Dept: PSYCHIATRY | Facility: CLINIC | Age: 52
End: 2022-11-04
Payer: MEDICAID

## 2022-11-07 ENCOUNTER — OFFICE VISIT (OUTPATIENT)
Dept: PSYCHIATRY | Facility: CLINIC | Age: 52
End: 2022-11-07
Payer: MEDICAID

## 2022-11-07 VITALS — HEART RATE: 75 BPM | BODY MASS INDEX: 29.29 KG/M2 | WEIGHT: 176 LBS

## 2022-11-07 DIAGNOSIS — F43.10 PTSD (POST-TRAUMATIC STRESS DISORDER): Primary | ICD-10-CM

## 2022-11-07 DIAGNOSIS — F09 COGNITIVE DISORDER: ICD-10-CM

## 2022-11-07 DIAGNOSIS — F32.1 CURRENT MODERATE EPISODE OF MAJOR DEPRESSIVE DISORDER, UNSPECIFIED WHETHER RECURRENT: ICD-10-CM

## 2022-11-07 PROCEDURE — 1159F MED LIST DOCD IN RCRD: CPT | Mod: AF,HB,CPTII,95 | Performed by: PSYCHIATRY & NEUROLOGY

## 2022-11-07 PROCEDURE — 99999 PR PBB SHADOW E&M-EST. PATIENT-LVL III: ICD-10-PCS | Mod: PBBFAC,AF,HB, | Performed by: PSYCHIATRY & NEUROLOGY

## 2022-11-07 PROCEDURE — 4010F ACE/ARB THERAPY RXD/TAKEN: CPT | Mod: AF,HB,CPTII,95 | Performed by: PSYCHIATRY & NEUROLOGY

## 2022-11-07 PROCEDURE — 4010F PR ACE/ARB THEARPY RXD/TAKEN: ICD-10-PCS | Mod: AF,HB,CPTII,95 | Performed by: PSYCHIATRY & NEUROLOGY

## 2022-11-07 PROCEDURE — 1160F PR REVIEW ALL MEDS BY PRESCRIBER/CLIN PHARMACIST DOCUMENTED: ICD-10-PCS | Mod: AF,HB,CPTII,95 | Performed by: PSYCHIATRY & NEUROLOGY

## 2022-11-07 PROCEDURE — 99999 PR PBB SHADOW E&M-EST. PATIENT-LVL III: CPT | Mod: PBBFAC,AF,HB, | Performed by: PSYCHIATRY & NEUROLOGY

## 2022-11-07 PROCEDURE — 99214 OFFICE O/P EST MOD 30 MIN: CPT | Mod: S$PBB,AF,HB,95 | Performed by: PSYCHIATRY & NEUROLOGY

## 2022-11-07 PROCEDURE — 3008F PR BODY MASS INDEX (BMI) DOCUMENTED: ICD-10-PCS | Mod: AF,HB,CPTII,95 | Performed by: PSYCHIATRY & NEUROLOGY

## 2022-11-07 PROCEDURE — 3008F BODY MASS INDEX DOCD: CPT | Mod: AF,HB,CPTII,95 | Performed by: PSYCHIATRY & NEUROLOGY

## 2022-11-07 PROCEDURE — 1160F RVW MEDS BY RX/DR IN RCRD: CPT | Mod: AF,HB,CPTII,95 | Performed by: PSYCHIATRY & NEUROLOGY

## 2022-11-07 PROCEDURE — 1159F PR MEDICATION LIST DOCUMENTED IN MEDICAL RECORD: ICD-10-PCS | Mod: AF,HB,CPTII,95 | Performed by: PSYCHIATRY & NEUROLOGY

## 2022-11-07 PROCEDURE — 99213 OFFICE O/P EST LOW 20 MIN: CPT | Mod: PBBFAC | Performed by: PSYCHIATRY & NEUROLOGY

## 2022-11-07 PROCEDURE — 99214 PR OFFICE/OUTPT VISIT, EST, LEVL IV, 30-39 MIN: ICD-10-PCS | Mod: S$PBB,AF,HB,95 | Performed by: PSYCHIATRY & NEUROLOGY

## 2022-11-07 RX ORDER — SERTRALINE HYDROCHLORIDE 50 MG/1
TABLET, FILM COATED ORAL
Qty: 30 TABLET | Refills: 2 | Status: SHIPPED | OUTPATIENT
Start: 2022-11-07 | End: 2023-03-07

## 2022-11-07 NOTE — PATIENT INSTRUCTIONS
1. Continue seroquel from 200mg nightly.  2. Increase Zoloft from 100mg to 150mg in the morning.   3. Ok to stop prazosin 1mg nightly.  4. Find cardiologist who accepts medicaid.     Follow up in mid-December.

## 2022-11-07 NOTE — PROGRESS NOTES
Subsequent Psychiatric Session- VIRTUAL    52 y.o. female    Reason for Follow Up:   PTSD, MDD, and MARQUITA, cognitive disorder    Current Medications:    Current Outpatient Medications:     ascorbic acid, vitamin C, (VITAMIN C) 500 MG tablet, Take 1 tablet (500 mg total) by mouth once daily., Disp: , Rfl:     butalbital-acetaminophen-caffeine -40 mg (FIORICET, ESGIC) -40 mg per tablet, Take 1 tablet by mouth every 4 (four) hours as needed for Pain., Disp: , Rfl:     celecoxib (CELEBREX) 200 MG capsule, Take 200 mg by mouth., Disp: , Rfl:     cetirizine 10 mg chewable tablet, Take 10 mg by mouth once daily., Disp: , Rfl:     fluticasone furoate-vilanteroL (BREO) 100-25 mcg/dose diskus inhaler, Inhale 1 puff into the lungs once daily. , Disp: , Rfl:     ibuprofen (ADVIL,MOTRIN) 600 MG tablet, Take 1 tablet (600 mg total) by mouth every 6 (six) hours as needed for Pain., Disp: 15 tablet, Rfl: 0    losartan (COZAAR) 25 MG tablet, Take 25 mg by mouth once daily., Disp: , Rfl:     meloxicam (MOBIC) 15 MG tablet, Take 15 mg by mouth once daily., Disp: , Rfl:     promethazine-phenylephrine (PROMETHAZINE VC) 6.25-5 mg/5 mL syrup, Take 1.25 mLs by mouth every 6 (six) hours as needed., Disp: , Rfl:     QUEtiapine (SEROQUEL) 200 MG Tab, Take 1 tablet (200 mg total) by mouth nightly., Disp: 30 tablet, Rfl: 2    rizatriptan (MAXALT) 10 MG tablet, Take 10 mg by mouth as needed for Migraine., Disp: , Rfl:     sertraline (ZOLOFT) 100 MG tablet, Take 1 tablet (100 mg total) by mouth once daily., Disp: 30 tablet, Rfl: 2    tiZANidine 4 mg Cap, Take 4 mg by mouth 3 (three) times daily., Disp: , Rfl:     albuterol (PROVENTIL/VENTOLIN HFA) 90 mcg/actuation inhaler, Inhale 2 puffs into the lungs every 6 (six) hours as needed for Wheezing. , Disp: , Rfl:     famotidine (PEPCID) 20 MG tablet, Take 20 mg by mouth 2 (two) times daily as needed. , Disp: , Rfl:     gabapentin (NEURONTIN) 300 MG capsule, Take 400 mg by mouth every  "evening., Disp: , Rfl:     losartan-hydrochlorothiazide 100-25 mg (HYZAAR) 100-25 mg per tablet, Take 1 tablet by mouth once daily. (Patient not taking: Reported on 8/1/2022), Disp: 90 tablet, Rfl: 0    sertraline (ZOLOFT) 50 MG tablet, Take in addition to the 100mg tablet for a total of 150mg in the morning., Disp: 30 tablet, Rfl: 2     Date of Last Visit:   10/05/22    In Clinic Since:   February 2022  Therapy:    None    Interval History  Patient says she is "not doing that great." She does not feel that she is getting "the full effect" with Zoloft. She was watching the movie "Till" and started crying and had to leave. She still feels "sad, as if I'm not really taking anything." She still will have triggers that cause intrusive thoughts. She has continued to have hallucinations at night and nightmares (e.g. somebody cutting someone in half.) She is wearing her CPAP machine. She thinks the seroquel is helpful at 200mg nightly. She stopped taking the prazosin, because she read that it could cause hallucinations. It helped for nightmares at first, but it does not seem to be effective anymore. She has been having panic attacks twice daily. She feels down about all the doctor's appointments she has. She hears things when she wakes up, as a continuation of dreams/nightmares. Denies medication side effects. Denies SI, HI.    Her puppy's name is Kelli. She would like to train the dog to be a service animal. Her boyfriend Joel is in [place] for work. He is going to be there for 6 months to 1 year.     Manic/Hypomanic Symptom Screening:  Since the last session, have you had any periods lasting at least a few days where your energy level was higher than normal and you did not need as much sleep at night to function the following day?: Denies    Substance Use Screening:  Cannabis: She uses medical marijuana tinctures 4 times daily  Does not use others.    Review of Measures  Patient refused    Scores from last session: "   PHQ-9: 23  MARQUITA-7: 21  MOCA: 17/30    Scores from initial visit:  PHQ9 Score:              21/27  GAD7 Score:              21/21     Review of PDMP  Reviewed    Constitutional     VITAL SIGNS  Temp     BP      HR 75    RR      SpO2           No results found for this or any previous visit (from the past 672 hour(s)).     MENTAL STATUS EXAM  General:  Alert and oriented x 4 Appearance is good grooming and hygiene, appears stated age, BMI 29.29. Behavior: cooperative, eye contact normal  Gait, Posture and Muscle Strength/Tone: Normal posture observed. Ambulates with walker. No gross abnormal movements noted.  Psychomotor Agitation and Retardation: none evident  Speech: Normal rate, volume, articulation, and tone.  Mood: +depression  Affect: full  Thought Process: Needs redirection back to question. Some tangentiality. No flight of ideas.  Associations:  Intact. No loosening of associations.  Thought content: Denies suicidal and homicidal thoughts, plans and intentions.  Perceptions: Denies any auditory or visual hallucinations at present. Does not appear internally preoccupied.  Orientation: Alert and oriented to person, place, date and situation  Attention and Concentration: Intact.   Memory: Has trouble with remembering the start and stop dates of medications  Language: No deficits noted    Fund of Knowledge: appropriate for age and level and education.   Insight:   good  Judgment: good  Impulse control: good      ASSESSMENT  Problem List Items Addressed This Visit          Neuro    Cognitive disorder       Psychiatric    PTSD (post-traumatic stress disorder) - Primary    Relevant Medications    sertraline (ZOLOFT) 50 MG tablet    Current moderate episode of major depressive disorder    Relevant Medications    sertraline (ZOLOFT) 50 MG tablet              PLAN  Patient Instructions   1. Continue seroquel from 200mg nightly.  2. Increase Zoloft from 100mg to 150mg in the morning.   3. Ok to stop prazosin 1mg  nightly.  4. Find cardiologist who accepts medicaid.     Follow up in mid-December.      -------------------------------------------------------------------------------    Joanne Mckeon  [facility]    Today's encounter took a total time of 30 minutes, and that time included interview and documentation, ordering medication.    Due to the pandemic, today's session was performed over video and telephone. Patient is confirmed to be in the [facility]. The participants are patient and myself.     Risks, Benefits, Side Effects and Alternatives were discussed with the patient today and consent was obtained for the current medication regimen. The patient was encouraged to ask questions and these questions were answered and the patient was engaged in psychoeducation regarding diagnosis, course of illness, accuracy of the diagnosis, and other general elements regarding overall diagnosis and treatment consideration.     Any medications being used off-label were discussed with the patient inclusive of the evidence base for the use of the medications and consent was obtained for the off-label use of the medication.     Brief suicide risk assessment was performed with the patient today and safety planning was performed if indicated.    Note completed by: Joanne Mckeon MD, 11/7/2022 3:41 PM

## 2022-12-21 ENCOUNTER — HOSPITAL ENCOUNTER (EMERGENCY)
Facility: OTHER | Age: 52
Discharge: HOME | End: 2022-12-21
Attending: EMERGENCY MEDICINE
Payer: MEDICAID

## 2022-12-21 VITALS
WEIGHT: 180 LBS | DIASTOLIC BLOOD PRESSURE: 81 MMHG | RESPIRATION RATE: 18 BRPM | OXYGEN SATURATION: 98 % | BODY MASS INDEX: 29.99 KG/M2 | TEMPERATURE: 98 F | HEART RATE: 75 BPM | HEIGHT: 65 IN | SYSTOLIC BLOOD PRESSURE: 161 MMHG

## 2022-12-21 DIAGNOSIS — S92.535A CLOSED NONDISPLACED FRACTURE OF DISTAL PHALANX OF LESSER TOE OF LEFT FOOT, INITIAL ENCOUNTER: Primary | ICD-10-CM

## 2022-12-21 DIAGNOSIS — M79.673 FOOT PAIN: ICD-10-CM

## 2022-12-21 LAB
B-HCG UR QL: NEGATIVE
CTP QC/QA: YES

## 2022-12-21 PROCEDURE — 99284 EMERGENCY DEPT VISIT MOD MDM: CPT | Mod: 25

## 2022-12-21 PROCEDURE — 25000003 PHARM REV CODE 250: Performed by: EMERGENCY MEDICINE

## 2022-12-21 PROCEDURE — 96372 THER/PROPH/DIAG INJ SC/IM: CPT | Performed by: EMERGENCY MEDICINE

## 2022-12-21 PROCEDURE — 81025 URINE PREGNANCY TEST: CPT | Performed by: EMERGENCY MEDICINE

## 2022-12-21 PROCEDURE — 63600175 PHARM REV CODE 636 W HCPCS: Performed by: EMERGENCY MEDICINE

## 2022-12-21 RX ORDER — HYDROCODONE BITARTRATE AND ACETAMINOPHEN 10; 325 MG/1; MG/1
1 TABLET ORAL
Status: COMPLETED | OUTPATIENT
Start: 2022-12-21 | End: 2022-12-21

## 2022-12-21 RX ORDER — KETOROLAC TROMETHAMINE 30 MG/ML
30 INJECTION, SOLUTION INTRAMUSCULAR; INTRAVENOUS
Status: COMPLETED | OUTPATIENT
Start: 2022-12-21 | End: 2022-12-21

## 2022-12-21 RX ORDER — IBUPROFEN 600 MG/1
600 TABLET ORAL EVERY 6 HOURS PRN
Qty: 20 TABLET | Refills: 0 | Status: SHIPPED | OUTPATIENT
Start: 2022-12-21 | End: 2023-11-01

## 2022-12-21 RX ORDER — METHOCARBAMOL 500 MG/1
1000 TABLET, FILM COATED ORAL
Status: COMPLETED | OUTPATIENT
Start: 2022-12-21 | End: 2022-12-21

## 2022-12-21 RX ORDER — METHYLPREDNISOLONE 4 MG/1
TABLET ORAL
Qty: 1 EACH | Refills: 0 | Status: SHIPPED | OUTPATIENT
Start: 2022-12-21 | End: 2023-01-11

## 2022-12-21 RX ADMIN — METHOCARBAMOL 1000 MG: 500 TABLET ORAL at 04:12

## 2022-12-21 RX ADMIN — KETOROLAC TROMETHAMINE 30 MG: 30 INJECTION, SOLUTION INTRAMUSCULAR; INTRAVENOUS at 04:12

## 2022-12-21 RX ADMIN — HYDROCODONE BITARTRATE AND ACETAMINOPHEN 1 TABLET: 10; 325 TABLET ORAL at 04:12

## 2022-12-21 NOTE — ED TRIAGE NOTES
Pt presents to the ER with complaints of worsening chronic lower back pain, L 5th toe pain after falling two weeks ago. Pt ambulatory in triage. Hx of HTN, anemia, bunion surgery on L foot
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
## 2022-12-21 NOTE — DISCHARGE INSTRUCTIONS
We have prescribed you medication for your pain.  Please fill and take as directed.    Please return to the ER if you have chest pain, difficulty breathing, fevers, altered mental status, dizziness, weakness, or any other concerns.      Follow up with your primary care physician and pain management physician

## 2022-12-21 NOTE — ED NOTES
LOC: The patient is awake, alert, and oriented to self, place, time, and situation. Pt is calm and cooperative. Affect is appropriate.  Speech is appropriate and clear.     APPEARANCE: Patient sitting in recliner in no acute distress.  Patient is clean and well groomed.    SKIN: The skin is warm and dry; color consistent with ethnicity.  Patient has normal skin turgor and moist mucus membranes.  Skin intact; no breakdown or bruising noted.     MUSCULOSKELETAL: Patient moving upper and lower extremities without difficulty but complains of pain in the left 5th toe; denies pain in the extremities but complains of chronic low back pain.  Denies weakness.     RESPIRATORY: Airway is open and patent. Respirations spontaneous, even, easy, and non-labored.  Patient has a normal effort and rate.  No accessory muscle use noted. Denies cough.     CARDIAC:  Normal rate noted. Pt is hypertensive.  No peripheral edema noted. No complaints of chest pain.      ABDOMEN: Soft and non tender to palpation.  No distention noted. Pt denies abdominal pain; denies nausea, vomiting, diarrhea, or constipation.    NEUROLOGIC: Eyes open spontaneously.  Behavior appropriate to situation.  Follows commands; facial expression symmetrical.  Purposeful motor response noted; normal sensation in all extremities. Pt denies headache; denies lightheadedness or dizziness; denies visual disturbances; denies loss of balance; denies unilateral weakness.

## 2022-12-23 ENCOUNTER — HOSPITAL ENCOUNTER (EMERGENCY)
Facility: OTHER | Age: 52
Discharge: HOME | End: 2022-12-23
Attending: EMERGENCY MEDICINE
Payer: MEDICAID

## 2022-12-23 VITALS
WEIGHT: 180 LBS | HEART RATE: 98 BPM | OXYGEN SATURATION: 99 % | BODY MASS INDEX: 29.95 KG/M2 | SYSTOLIC BLOOD PRESSURE: 153 MMHG | TEMPERATURE: 98 F | RESPIRATION RATE: 16 BRPM | DIASTOLIC BLOOD PRESSURE: 80 MMHG

## 2022-12-23 DIAGNOSIS — S92.505D CLOSED NONDISPLACED FRACTURE OF PHALANX OF LESSER TOE OF LEFT FOOT WITH ROUTINE HEALING, UNSPECIFIED PHALANX, SUBSEQUENT ENCOUNTER: Primary | ICD-10-CM

## 2022-12-23 PROCEDURE — 99283 EMERGENCY DEPT VISIT LOW MDM: CPT

## 2022-12-23 PROCEDURE — 25000003 PHARM REV CODE 250: Performed by: EMERGENCY MEDICINE

## 2022-12-23 RX ORDER — HYDROCODONE BITARTRATE AND ACETAMINOPHEN 5; 325 MG/1; MG/1
TABLET ORAL
Status: DISCONTINUED
Start: 2022-12-23 | End: 2022-12-23 | Stop reason: HOSPADM

## 2022-12-23 RX ORDER — HYDROCODONE BITARTRATE AND ACETAMINOPHEN 5; 325 MG/1; MG/1
2 TABLET ORAL
Status: COMPLETED | OUTPATIENT
Start: 2022-12-23 | End: 2022-12-23

## 2022-12-23 RX ORDER — HYDROCODONE BITARTRATE AND ACETAMINOPHEN 5; 325 MG/1; MG/1
1-2 TABLET ORAL EVERY 6 HOURS PRN
Qty: 10 TABLET | Refills: 0 | Status: SHIPPED | OUTPATIENT
Start: 2022-12-23 | End: 2023-01-02

## 2022-12-23 RX ADMIN — HYDROCODONE BITARTRATE AND ACETAMINOPHEN 2 TABLET: 5; 325 TABLET ORAL at 09:12
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
## 2022-12-27 ENCOUNTER — PATIENT MESSAGE (OUTPATIENT)
Dept: PSYCHIATRY | Facility: CLINIC | Age: 52
End: 2022-12-27
Payer: MEDICAID

## 2022-12-29 ENCOUNTER — PATIENT OUTREACH (OUTPATIENT)
Dept: EMERGENCY MEDICINE | Facility: OTHER | Age: 52
End: 2022-12-29
Payer: MEDICAID

## 2022-12-29 ENCOUNTER — TELEPHONE (OUTPATIENT)
Dept: PODIATRY | Facility: CLINIC | Age: 52
End: 2022-12-29
Payer: MEDICAID

## 2022-12-29 NOTE — TELEPHONE ENCOUNTER
----- Message from Nae Jonas sent at 12/29/2022  2:50 PM CST -----  Name of Who is Calling: EUN PAGAN [[id number]]              What is the request in detail: Patient requesting a call back to discuss scheduling an appointment              Can the clinic reply by [facility]: No              What Number to Call Back if not in [facility]: [phone]
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
## 2022-12-29 NOTE — TELEPHONE ENCOUNTER
"Staff contacted and spoke with patient to schedule her an appointment.    Patient stated "I am on the phone with someone else and they have a sooner appointment."      "

## 2023-02-09 ENCOUNTER — PATIENT MESSAGE (OUTPATIENT)
Dept: PSYCHIATRY | Facility: CLINIC | Age: 53
End: 2023-02-09
Payer: MEDICAID

## 2023-02-13 ENCOUNTER — OFFICE VISIT (OUTPATIENT)
Dept: PSYCHIATRY | Facility: CLINIC | Age: 53
End: 2023-02-13
Payer: MEDICAID

## 2023-02-13 DIAGNOSIS — F41.1 GAD (GENERALIZED ANXIETY DISORDER): ICD-10-CM

## 2023-02-13 DIAGNOSIS — F32.1 CURRENT MODERATE EPISODE OF MAJOR DEPRESSIVE DISORDER, UNSPECIFIED WHETHER RECURRENT: ICD-10-CM

## 2023-02-13 DIAGNOSIS — F09 COGNITIVE DISORDER: ICD-10-CM

## 2023-02-13 DIAGNOSIS — F43.10 PTSD (POST-TRAUMATIC STRESS DISORDER): Primary | ICD-10-CM

## 2023-02-13 PROCEDURE — 4010F ACE/ARB THERAPY RXD/TAKEN: CPT | Mod: AF,HB,CPTII,95 | Performed by: PSYCHIATRY & NEUROLOGY

## 2023-02-13 PROCEDURE — 1160F PR REVIEW ALL MEDS BY PRESCRIBER/CLIN PHARMACIST DOCUMENTED: ICD-10-PCS | Mod: AF,HB,CPTII,95 | Performed by: PSYCHIATRY & NEUROLOGY

## 2023-02-13 PROCEDURE — 1159F PR MEDICATION LIST DOCUMENTED IN MEDICAL RECORD: ICD-10-PCS | Mod: AF,HB,CPTII,95 | Performed by: PSYCHIATRY & NEUROLOGY

## 2023-02-13 PROCEDURE — 99214 PR OFFICE/OUTPT VISIT, EST, LEVL IV, 30-39 MIN: ICD-10-PCS | Mod: AF,HB,95, | Performed by: PSYCHIATRY & NEUROLOGY

## 2023-02-13 PROCEDURE — 4010F PR ACE/ARB THEARPY RXD/TAKEN: ICD-10-PCS | Mod: AF,HB,CPTII,95 | Performed by: PSYCHIATRY & NEUROLOGY

## 2023-02-13 PROCEDURE — 99214 OFFICE O/P EST MOD 30 MIN: CPT | Mod: AF,HB,95, | Performed by: PSYCHIATRY & NEUROLOGY

## 2023-02-13 PROCEDURE — 1160F RVW MEDS BY RX/DR IN RCRD: CPT | Mod: AF,HB,CPTII,95 | Performed by: PSYCHIATRY & NEUROLOGY

## 2023-02-13 PROCEDURE — 1159F MED LIST DOCD IN RCRD: CPT | Mod: AF,HB,CPTII,95 | Performed by: PSYCHIATRY & NEUROLOGY

## 2023-02-13 NOTE — PROGRESS NOTES
Subsequent Psychiatric Session- VIRTUAL    52 y.o. female    Reason for Follow Up:   PTSD, MDD, and MARQUITA, cognitive disorder    Current Medications:    Current Outpatient Medications:     albuterol (PROVENTIL/VENTOLIN HFA) 90 mcg/actuation inhaler, Inhale 2 puffs into the lungs every 6 (six) hours as needed for Wheezing. , Disp: , Rfl:     ascorbic acid, vitamin C, (VITAMIN C) 500 MG tablet, Take 1 tablet (500 mg total) by mouth once daily., Disp: , Rfl:     butalbital-acetaminophen-caffeine -40 mg (FIORICET, ESGIC) -40 mg per tablet, Take 1 tablet by mouth every 4 (four) hours as needed for Pain., Disp: , Rfl:     celecoxib (CELEBREX) 200 MG capsule, Take 200 mg by mouth., Disp: , Rfl:     cetirizine 10 mg chewable tablet, Take 10 mg by mouth once daily., Disp: , Rfl:     famotidine (PEPCID) 20 MG tablet, Take 20 mg by mouth 2 (two) times daily as needed. , Disp: , Rfl:     fluticasone furoate-vilanteroL (BREO) 100-25 mcg/dose diskus inhaler, Inhale 1 puff into the lungs once daily. , Disp: , Rfl:     gabapentin (NEURONTIN) 300 MG capsule, Take 400 mg by mouth every evening., Disp: , Rfl:     ibuprofen (ADVIL,MOTRIN) 600 MG tablet, Take 1 tablet (600 mg total) by mouth every 6 (six) hours as needed for Pain., Disp: 20 tablet, Rfl: 0    losartan (COZAAR) 25 MG tablet, Take 25 mg by mouth once daily., Disp: , Rfl:     meloxicam (MOBIC) 15 MG tablet, Take 15 mg by mouth once daily., Disp: , Rfl:     promethazine-phenylephrine (PROMETHAZINE VC) 6.25-5 mg/5 mL syrup, Take 1.25 mLs by mouth every 6 (six) hours as needed., Disp: , Rfl:     QUEtiapine (SEROQUEL) 200 MG Tab, TAKE 1 TABLET(200 MG) BY MOUTH EVERY NIGHT, Disp: 30 tablet, Rfl: 2    rizatriptan (MAXALT) 10 MG tablet, Take 10 mg by mouth as needed for Migraine., Disp: , Rfl:     sertraline (ZOLOFT) 100 MG tablet, Take 1 tablet (100 mg total) by mouth once daily., Disp: 30 tablet, Rfl: 2    sertraline (ZOLOFT) 50 MG tablet, Take in addition to the 100mg  "tablet for a total of 150mg in the morning., Disp: 30 tablet, Rfl: 2    tiZANidine 4 mg Cap, Take 4 mg by mouth 3 (three) times daily., Disp: , Rfl:      Date of Last Visit:   11/07/22    In Clinic Since:   February 2022  Therapy:    None    Interval History  She and [place] have broken up: "He called me Sibil. He says I have too many personalities." She says of Zoloft: "It just doesn't work for me." She has been waking up 2-3 times per night. She fractured her toe and was in a boot cast for three nights. She saw "flying spiders" in her sleep. She has not tried lamictal before. She asks to increase seroquel. She denies SI. When asking about HI, patient answers "sometimes, and that's why I don't go outside." She denies any desire or plan to hurt anyone. She denies AH.    Her puppy's name is Kelli.     Manic/Hypomanic Symptom Screening:  Since the last session, have you had any periods lasting at least a few days where your energy level was higher than normal and you did not need as much sleep at night to function the following day?: Denies    Substance Use Screening:  Cannabis: She uses medical marijuana tinctures 4 times daily  Does not use others.    Review of Measures  PHQ9 Score:              27/27  GAD7 Score:              21/21     Scores from last session:   Patient refused    PHQ-9: 23  MARQUITA-7: 21  MOCA: 17/30    Scores from initial visit:  PHQ9 Score:              21/27  GAD7 Score:              21/21     Review of PDMP  Reviewed    Constitutional     VITAL SIGNS  Temp     BP      HR      RR      SpO2           No results found for this or any previous visit (from the past 672 hour(s)).     MENTAL STATUS EXAM  General:  Alert and oriented x 4 Appearance is good grooming and hygiene, appears stated age, BMI 29.29. Behavior: cooperative, eye contact normal  Gait, Posture and Muscle Strength/Tone: Normal posture observed. No gross abnormal movements noted.  Psychomotor Agitation and Retardation: none " evident  Speech: Normal rate, volume, articulation, and tone.  Mood: +depression  Affect: full  Thought Process: Needs redirection back to question. Some tangentiality. No flight of ideas.  Associations:  Intact. No loosening of associations.  Thought content: Denies suicidal and homicidal thoughts, plans and intentions.  Perceptions: Denies any auditory or visual hallucinations at present. Does not appear internally preoccupied.  Orientation: Alert and oriented to person, place, date and situation  Attention and Concentration: Intact.   Memory: Has trouble with remembering the start and stop dates of medications  Language: No deficits noted    Fund of Knowledge: appropriate for age and level and education.   Insight:   good  Judgment: good  Impulse control: good      ASSESSMENT  Problem List Items Addressed This Visit          Neuro    Cognitive disorder       Psychiatric    PTSD (post-traumatic stress disorder) - Primary    Current moderate episode of major depressive disorder    MARQUITA (generalized anxiety disorder)       Discussed long-term side effects of antipsychotic use like tardive dyskinesia as a reason for not wanting to increase seroquel unless necessary.         PLAN  There are no Patient Instructions on file for this visit.      -------------------------------------------------------------------------------    Joanne Mckeon  [facility] - PSYCHIATRY    Today's encounter took a total time of 30 minutes, and that time included interview and documentation, ordering medication.    Due to the pandemic, today's session was performed over video and telephone. Patient is confirmed to be in the [facility]. The participants are patient and myself.     Risks, Benefits, Side Effects and Alternatives were discussed with the patient today and consent was obtained for the current medication regimen. The patient was encouraged to ask questions and these questions were answered and the patient was engaged in  psychoeducation regarding diagnosis, course of illness, accuracy of the diagnosis, and other general elements regarding overall diagnosis and treatment consideration.     Any medications being used off-label were discussed with the patient inclusive of the evidence base for the use of the medications and consent was obtained for the off-label use of the medication.     Brief suicide risk assessment was performed with the patient today and safety planning was performed if indicated.    Note completed by: Joanne Mckeon MD, 2/15/2023 3:41 PM

## 2023-02-28 ENCOUNTER — PATIENT OUTREACH (OUTPATIENT)
Dept: EMERGENCY MEDICINE | Facility: OTHER | Age: 53
End: 2023-02-28
Payer: MEDICAID

## 2023-02-28 NOTE — PROGRESS NOTES
Unable to reach by phone for first F/U. Will attempt second F/U at a later date. Closing encounter.

## 2023-03-06 ENCOUNTER — TELEPHONE (OUTPATIENT)
Dept: ORTHOPEDICS | Facility: CLINIC | Age: 53
End: 2023-03-06
Payer: MEDICAID

## 2023-03-06 ENCOUNTER — TELEPHONE (OUTPATIENT)
Dept: SLEEP MEDICINE | Facility: CLINIC | Age: 53
End: 2023-03-06
Payer: MEDICAID

## 2023-03-06 ENCOUNTER — TELEPHONE (OUTPATIENT)
Dept: SPINE | Facility: CLINIC | Age: 53
End: 2023-03-06
Payer: MEDICAID
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
## 2023-03-06 NOTE — TELEPHONE ENCOUNTER
I called her back we have no availabilities for her insurance at this time  she did call Medicaid , they gave her a few numbers to call , she thanked me

## 2023-03-06 NOTE — TELEPHONE ENCOUNTER
I have attempted without success to contact this patient by phone. Left VM for pt to contact clinic regarding appointment.
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
## 2023-03-07 ENCOUNTER — OFFICE VISIT (OUTPATIENT)
Dept: PSYCHIATRY | Facility: CLINIC | Age: 53
End: 2023-03-07
Payer: MEDICAID

## 2023-03-07 VITALS
WEIGHT: 183.19 LBS | SYSTOLIC BLOOD PRESSURE: 155 MMHG | BODY MASS INDEX: 30.49 KG/M2 | DIASTOLIC BLOOD PRESSURE: 74 MMHG | TEMPERATURE: 97 F | HEART RATE: 65 BPM

## 2023-03-07 DIAGNOSIS — F32.1 CURRENT MODERATE EPISODE OF MAJOR DEPRESSIVE DISORDER, UNSPECIFIED WHETHER RECURRENT: Primary | ICD-10-CM

## 2023-03-07 DIAGNOSIS — F41.1 GAD (GENERALIZED ANXIETY DISORDER): ICD-10-CM

## 2023-03-07 DIAGNOSIS — F43.10 PTSD (POST-TRAUMATIC STRESS DISORDER): ICD-10-CM

## 2023-03-07 PROCEDURE — 3078F PR MOST RECENT DIASTOLIC BLOOD PRESSURE < 80 MM HG: ICD-10-PCS | Mod: AF,HB,CPTII, | Performed by: PSYCHIATRY & NEUROLOGY

## 2023-03-07 PROCEDURE — 3008F BODY MASS INDEX DOCD: CPT | Mod: AF,HB,CPTII, | Performed by: PSYCHIATRY & NEUROLOGY

## 2023-03-07 PROCEDURE — 1159F PR MEDICATION LIST DOCUMENTED IN MEDICAL RECORD: ICD-10-PCS | Mod: AF,HB,CPTII, | Performed by: PSYCHIATRY & NEUROLOGY

## 2023-03-07 PROCEDURE — 3077F PR MOST RECENT SYSTOLIC BLOOD PRESSURE >= 140 MM HG: ICD-10-PCS | Mod: AF,HB,CPTII, | Performed by: PSYCHIATRY & NEUROLOGY

## 2023-03-07 PROCEDURE — 99999 PR PBB SHADOW E&M-EST. PATIENT-LVL III: CPT | Mod: PBBFAC,AF,HB, | Performed by: PSYCHIATRY & NEUROLOGY

## 2023-03-07 PROCEDURE — 3008F PR BODY MASS INDEX (BMI) DOCUMENTED: ICD-10-PCS | Mod: AF,HB,CPTII, | Performed by: PSYCHIATRY & NEUROLOGY

## 2023-03-07 PROCEDURE — 1159F MED LIST DOCD IN RCRD: CPT | Mod: AF,HB,CPTII, | Performed by: PSYCHIATRY & NEUROLOGY

## 2023-03-07 PROCEDURE — 4010F PR ACE/ARB THEARPY RXD/TAKEN: ICD-10-PCS | Mod: AF,HB,CPTII, | Performed by: PSYCHIATRY & NEUROLOGY

## 2023-03-07 PROCEDURE — 99999 PR PBB SHADOW E&M-EST. PATIENT-LVL III: ICD-10-PCS | Mod: PBBFAC,AF,HB, | Performed by: PSYCHIATRY & NEUROLOGY

## 2023-03-07 PROCEDURE — 99214 OFFICE O/P EST MOD 30 MIN: CPT | Mod: S$PBB,AF,HB, | Performed by: PSYCHIATRY & NEUROLOGY

## 2023-03-07 PROCEDURE — 99213 OFFICE O/P EST LOW 20 MIN: CPT | Mod: PBBFAC | Performed by: PSYCHIATRY & NEUROLOGY

## 2023-03-07 PROCEDURE — 3078F DIAST BP <80 MM HG: CPT | Mod: AF,HB,CPTII, | Performed by: PSYCHIATRY & NEUROLOGY

## 2023-03-07 PROCEDURE — 3077F SYST BP >= 140 MM HG: CPT | Mod: AF,HB,CPTII, | Performed by: PSYCHIATRY & NEUROLOGY

## 2023-03-07 PROCEDURE — 99214 PR OFFICE/OUTPT VISIT, EST, LEVL IV, 30-39 MIN: ICD-10-PCS | Mod: S$PBB,AF,HB, | Performed by: PSYCHIATRY & NEUROLOGY

## 2023-03-07 PROCEDURE — 4010F ACE/ARB THERAPY RXD/TAKEN: CPT | Mod: AF,HB,CPTII, | Performed by: PSYCHIATRY & NEUROLOGY

## 2023-03-07 RX ORDER — QUETIAPINE FUMARATE 300 MG/1
300 TABLET, FILM COATED ORAL NIGHTLY
Qty: 30 TABLET | Refills: 1 | Status: SHIPPED | OUTPATIENT
Start: 2023-03-07 | End: 2023-04-12 | Stop reason: SDUPTHER

## 2023-03-07 RX ORDER — SERTRALINE HYDROCHLORIDE 25 MG/1
TABLET, FILM COATED ORAL
Qty: 14 TABLET | Refills: 0 | Status: SHIPPED | OUTPATIENT
Start: 2023-03-07 | End: 2023-03-21

## 2023-03-07 RX ORDER — LAMOTRIGINE 25 MG/1
25 TABLET ORAL DAILY
Qty: 42 TABLET | Refills: 0 | Status: SHIPPED | OUTPATIENT
Start: 2023-03-07 | End: 2023-04-12

## 2023-03-07 NOTE — PROGRESS NOTES
Subsequent Psychiatric Session  52 y.o. female    Reason for Follow Up:   PTSD, MDD, and MARQUITA, cognitive disorder    Current Medications:    Current Outpatient Medications:     ascorbic acid, vitamin C, (VITAMIN C) 500 MG tablet, Take 1 tablet (500 mg total) by mouth once daily., Disp: , Rfl:     butalbital-acetaminophen-caffeine -40 mg (FIORICET, ESGIC) -40 mg per tablet, Take 1 tablet by mouth every 4 (four) hours as needed for Pain., Disp: , Rfl:     celecoxib (CELEBREX) 200 MG capsule, Take 200 mg by mouth., Disp: , Rfl:     cetirizine 10 mg chewable tablet, Take 10 mg by mouth once daily., Disp: , Rfl:     fluticasone furoate-vilanteroL (BREO) 100-25 mcg/dose diskus inhaler, Inhale 1 puff into the lungs once daily. , Disp: , Rfl:     ibuprofen (ADVIL,MOTRIN) 600 MG tablet, Take 1 tablet (600 mg total) by mouth every 6 (six) hours as needed for Pain., Disp: 20 tablet, Rfl: 0    losartan (COZAAR) 25 MG tablet, Take 25 mg by mouth once daily., Disp: , Rfl:     meloxicam (MOBIC) 15 MG tablet, Take 15 mg by mouth once daily., Disp: , Rfl:     promethazine-phenylephrine (PROMETHAZINE VC) 6.25-5 mg/5 mL syrup, Take 1.25 mLs by mouth every 6 (six) hours as needed., Disp: , Rfl:     rizatriptan (MAXALT) 10 MG tablet, Take 10 mg by mouth as needed for Migraine., Disp: , Rfl:     tiZANidine 4 mg Cap, Take 4 mg by mouth 3 (three) times daily., Disp: , Rfl:     albuterol (PROVENTIL/VENTOLIN HFA) 90 mcg/actuation inhaler, Inhale 2 puffs into the lungs every 6 (six) hours as needed for Wheezing. , Disp: , Rfl:     famotidine (PEPCID) 20 MG tablet, Take 20 mg by mouth 2 (two) times daily as needed. , Disp: , Rfl:     gabapentin (NEURONTIN) 300 MG capsule, Take 400 mg by mouth every evening., Disp: , Rfl:     lamoTRIgine (LAMICTAL) 25 MG tablet, Take 1 tablet (25 mg total) by mouth once daily., Disp: 42 tablet, Rfl: 0    QUEtiapine (SEROQUEL) 300 MG Tab, Take 1 tablet (300 mg total) by mouth every evening., Disp: 30  "tablet, Rfl: 1    sertraline (ZOLOFT) 25 MG tablet, Take in addition to the 100mg tablet for a total of 150mg in the morning., Disp: 14 tablet, Rfl: 0     Date of Last Visit:   02/13/23    In Clinic Since:   February 2022  Therapy:    None    Interval History  She feels "the same" as last time." She has not been sleeping well. She said the seroquel increase was never sent. She fractured her toe and was in a boot cast for three nights. She continues to see spiders and ants. She hallucinated seeing her dog and even took a picture to show her son and the picture did not show her dog.     She saw the cardiologist this morning. She needs more iron infusions. She was referred to rheumatology for her hands. She is feeling faint and dizzy. She did not take her pain meds this morning because she had to leave the house for doctor's appointments. Her back and toe are hurting. She has not been wearing her boot because she finds it "very depressing."    She has passive SI daily. She says she would let her son or this writer know if she was having strong suicidal thoughts/urges. She denies any desire or plan to hurt anyone. She denies AH.    Her puppy's name is Kelli.     Manic/Hypomanic Symptom Screening:  Since the last session, have you had any periods lasting at least a few days where your energy level was higher than normal and you did not need as much sleep at night to function the following day?: Denies    Substance Use Screening:  Cannabis: She uses medical marijuana tinctures 4 times daily  Does not use others.    Review of Measures  PHQ9 Score:              27  GAD7 Score:              21    Scores from last session:   PHQ9 Score:              27/27  GAD7 Score:              21/21     Patient refused    PHQ-9: 23  MARQUITA-7: 21  MOCA: 17/30    Scores from initial visit:  PHQ9 Score:              21/27  GAD7 Score:              21/21     Review of PDMP  Reviewed    Constitutional     VITAL SIGNS  Temp 97.3 °F (36.3 °C)   BP " (!) 155/74    HR 65    RR      SpO2           No results found for this or any previous visit (from the past 672 hour(s)).     MENTAL STATUS EXAM  General:  Alert and oriented x 4 Appearance is good grooming and hygiene, appears stated age, BMI 30.49. Behavior: cooperative, eye contact normal  Gait, Posture and Muscle Strength/Tone: Stooped posture. Ambulates with walker. No gross abnormal movements noted.  Psychomotor Agitation and Retardation: none evident. AIMS 0  Speech: Normal rate, volume, articulation, and tone.  Mood: +depression   Affect: full  Thought Process: Needs redirection back to question. Some tangentiality. No flight of ideas.  Associations:  Intact. No loosening of associations.  Thought content: Denies suicidal and homicidal thoughts, plans and intentions.  Perceptions: Denies any auditory or visual hallucinations at present. Does not appear internally preoccupied.  Orientation: Alert and oriented to person, place, date and situation  Attention and Concentration: Intact.   Memory: Has trouble with remembering the start and stop dates of medications  Language: No deficits noted    Fund of Knowledge: appropriate for age and level and education.   Insight:   good  Judgment: good  Impulse control: good      ASSESSMENT  Problem List Items Addressed This Visit          Psychiatric    PTSD (post-traumatic stress disorder)    Relevant Medications    sertraline (ZOLOFT) 25 MG tablet    Current moderate episode of major depressive disorder - Primary    Relevant Medications    lamoTRIgine (LAMICTAL) 25 MG tablet    QUEtiapine (SEROQUEL) 300 MG Tab    sertraline (ZOLOFT) 25 MG tablet    MARQUITA (generalized anxiety disorder)    Relevant Medications    lamoTRIgine (LAMICTAL) 25 MG tablet           PLAN  Patient Instructions   Increase Seroquel from 200mg to 300mg nightly.  Decrease Zoloft from 50mg to 25mg daily. After two weeks, stop.  Start lamictal 25mg daily. Start lamictal at 25mg daily for two weeks, then  increase to 50mg daily for another two weeks. Please contact Dr. Mckeon if you see any new rashes when starting this medication.    Follow up in 2 weeks      -------------------------------------------------------------------------------    Joanne Mckeon  [facility] - PSYCHIATRY    Today's encounter took a total time of 30 minutes, and that time included interview and documentation, ordering medication.    Risks, Benefits, Side Effects and Alternatives were discussed with the patient today and consent was obtained for the current medication regimen. The patient was encouraged to ask questions and these questions were answered and the patient was engaged in psychoeducation regarding diagnosis, course of illness, accuracy of the diagnosis, and other general elements regarding overall diagnosis and treatment consideration.     Any medications being used off-label were discussed with the patient inclusive of the evidence base for the use of the medications and consent was obtained for the off-label use of the medication.     Brief suicide risk assessment was performed with the patient today and safety planning was performed if indicated.    Note completed by: Joanne Mckeon MD, 3/7/2023 3:41 PM

## 2023-03-07 NOTE — PATIENT INSTRUCTIONS
Increase Seroquel from 200mg to 300mg nightly.  Decrease Zoloft from 50mg to 25mg daily. After two weeks, stop.  Start lamictal 25mg daily. Start lamictal at 25mg daily for two weeks, then increase to 50mg daily for another two weeks. Please contact Dr. Mckeon if you see any new rashes when starting this medication.    Follow up in 2 weeks

## 2023-03-21 ENCOUNTER — OFFICE VISIT (OUTPATIENT)
Dept: PSYCHIATRY | Facility: CLINIC | Age: 53
End: 2023-03-21
Payer: MEDICAID

## 2023-03-21 VITALS
TEMPERATURE: 97 F | WEIGHT: 186.31 LBS | DIASTOLIC BLOOD PRESSURE: 77 MMHG | SYSTOLIC BLOOD PRESSURE: 142 MMHG | BODY MASS INDEX: 31 KG/M2 | HEART RATE: 77 BPM

## 2023-03-21 DIAGNOSIS — F32.1 CURRENT MODERATE EPISODE OF MAJOR DEPRESSIVE DISORDER, UNSPECIFIED WHETHER RECURRENT: Primary | ICD-10-CM

## 2023-03-21 PROCEDURE — 3077F PR MOST RECENT SYSTOLIC BLOOD PRESSURE >= 140 MM HG: ICD-10-PCS | Mod: AF,HB,CPTII, | Performed by: PSYCHIATRY & NEUROLOGY

## 2023-03-21 PROCEDURE — 99214 OFFICE O/P EST MOD 30 MIN: CPT | Mod: S$PBB,AF,HB, | Performed by: PSYCHIATRY & NEUROLOGY

## 2023-03-21 PROCEDURE — 3077F SYST BP >= 140 MM HG: CPT | Mod: AF,HB,CPTII, | Performed by: PSYCHIATRY & NEUROLOGY

## 2023-03-21 PROCEDURE — 99213 OFFICE O/P EST LOW 20 MIN: CPT | Mod: PBBFAC | Performed by: PSYCHIATRY & NEUROLOGY

## 2023-03-21 PROCEDURE — 99999 PR PBB SHADOW E&M-EST. PATIENT-LVL III: CPT | Mod: PBBFAC,AF,HB, | Performed by: PSYCHIATRY & NEUROLOGY

## 2023-03-21 PROCEDURE — 1160F RVW MEDS BY RX/DR IN RCRD: CPT | Mod: AF,HB,CPTII, | Performed by: PSYCHIATRY & NEUROLOGY

## 2023-03-21 PROCEDURE — 1159F PR MEDICATION LIST DOCUMENTED IN MEDICAL RECORD: ICD-10-PCS | Mod: AF,HB,CPTII, | Performed by: PSYCHIATRY & NEUROLOGY

## 2023-03-21 PROCEDURE — 1160F PR REVIEW ALL MEDS BY PRESCRIBER/CLIN PHARMACIST DOCUMENTED: ICD-10-PCS | Mod: AF,HB,CPTII, | Performed by: PSYCHIATRY & NEUROLOGY

## 2023-03-21 PROCEDURE — 3078F DIAST BP <80 MM HG: CPT | Mod: AF,HB,CPTII, | Performed by: PSYCHIATRY & NEUROLOGY

## 2023-03-21 PROCEDURE — 4010F PR ACE/ARB THEARPY RXD/TAKEN: ICD-10-PCS | Mod: AF,HB,CPTII, | Performed by: PSYCHIATRY & NEUROLOGY

## 2023-03-21 PROCEDURE — 3008F PR BODY MASS INDEX (BMI) DOCUMENTED: ICD-10-PCS | Mod: AF,HB,CPTII, | Performed by: PSYCHIATRY & NEUROLOGY

## 2023-03-21 PROCEDURE — 3008F BODY MASS INDEX DOCD: CPT | Mod: AF,HB,CPTII, | Performed by: PSYCHIATRY & NEUROLOGY

## 2023-03-21 PROCEDURE — 1159F MED LIST DOCD IN RCRD: CPT | Mod: AF,HB,CPTII, | Performed by: PSYCHIATRY & NEUROLOGY

## 2023-03-21 PROCEDURE — 99999 PR PBB SHADOW E&M-EST. PATIENT-LVL III: ICD-10-PCS | Mod: PBBFAC,AF,HB, | Performed by: PSYCHIATRY & NEUROLOGY

## 2023-03-21 PROCEDURE — 99214 PR OFFICE/OUTPT VISIT, EST, LEVL IV, 30-39 MIN: ICD-10-PCS | Mod: S$PBB,AF,HB, | Performed by: PSYCHIATRY & NEUROLOGY

## 2023-03-21 PROCEDURE — 4010F ACE/ARB THERAPY RXD/TAKEN: CPT | Mod: AF,HB,CPTII, | Performed by: PSYCHIATRY & NEUROLOGY

## 2023-03-21 PROCEDURE — 3078F PR MOST RECENT DIASTOLIC BLOOD PRESSURE < 80 MM HG: ICD-10-PCS | Mod: AF,HB,CPTII, | Performed by: PSYCHIATRY & NEUROLOGY

## 2023-03-21 NOTE — PATIENT INSTRUCTIONS
Continue Seroquel 300mg nightly.  Increase lamictal from 25mg to 50mg daily for another two weeks. Please contact Dr. Mckeon if you see any new rashes when starting this medication.     Follow up in 2 weeks

## 2023-03-21 NOTE — PROGRESS NOTES
Subsequent Psychiatric Session  52 y.o. female    Reason for Follow Up:   PTSD, MDD, and MARQUITA, cognitive disorder    Current Medications:    Current Outpatient Medications:     ascorbic acid, vitamin C, (VITAMIN C) 500 MG tablet, Take 1 tablet (500 mg total) by mouth once daily., Disp: , Rfl:     butalbital-acetaminophen-caffeine -40 mg (FIORICET, ESGIC) -40 mg per tablet, Take 1 tablet by mouth every 4 (four) hours as needed for Pain., Disp: , Rfl:     celecoxib (CELEBREX) 200 MG capsule, Take 200 mg by mouth., Disp: , Rfl:     cetirizine 10 mg chewable tablet, Take 10 mg by mouth once daily., Disp: , Rfl:     fluticasone furoate-vilanteroL (BREO) 100-25 mcg/dose diskus inhaler, Inhale 1 puff into the lungs once daily. , Disp: , Rfl:     ibuprofen (ADVIL,MOTRIN) 600 MG tablet, Take 1 tablet (600 mg total) by mouth every 6 (six) hours as needed for Pain., Disp: 20 tablet, Rfl: 0    lamoTRIgine (LAMICTAL) 25 MG tablet, Take 1 tablet (25 mg total) by mouth once daily., Disp: 42 tablet, Rfl: 0    losartan (COZAAR) 25 MG tablet, Take 25 mg by mouth once daily., Disp: , Rfl:     meloxicam (MOBIC) 15 MG tablet, Take 15 mg by mouth once daily., Disp: , Rfl:     promethazine-phenylephrine (PROMETHAZINE VC) 6.25-5 mg/5 mL syrup, Take 1.25 mLs by mouth every 6 (six) hours as needed., Disp: , Rfl:     QUEtiapine (SEROQUEL) 300 MG Tab, Take 1 tablet (300 mg total) by mouth every evening., Disp: 30 tablet, Rfl: 1    rizatriptan (MAXALT) 10 MG tablet, Take 10 mg by mouth as needed for Migraine., Disp: , Rfl:     tiZANidine 4 mg Cap, Take 4 mg by mouth 3 (three) times daily., Disp: , Rfl:     albuterol (PROVENTIL/VENTOLIN HFA) 90 mcg/actuation inhaler, Inhale 2 puffs into the lungs every 6 (six) hours as needed for Wheezing. , Disp: , Rfl:     famotidine (PEPCID) 20 MG tablet, Take 20 mg by mouth 2 (two) times daily as needed. , Disp: , Rfl:     gabapentin (NEURONTIN) 300 MG capsule, Take 400 mg by mouth every evening.,  Disp: , Rfl:      Date of Last Visit:   03/07/23    In Clinic Since:   February 2022  Therapy:    She does not want therapy, because it makes her think about her childhood trauma or other sad things and worsens her mood.    Interval History  Patient missed her 8:30am appointment, because she forgot about it. She is distressed at the number of medications and health issues she has. She needs medical records for [facility]. Patient's sleep has improved since the increase in Seroquel, but she continues to feel severely depressed and anxious. She only had breakfast yesterday and only coffee before this appointment at 1pm. She shares that she had anorexia younger, and that she needed to be on periactin to improve her appetite. (She has gained three lbs over the past two weeks.) She continues to have passive SI daily. She repeats that she would let her son or this writer know if she was having strong suicidal thoughts/urges. She denies any desire or plan to hurt anyone. She denies AH.    Her puppy's name is Kelli.     Medication Trials:                             She has been on Xanax 1mg daily for the past ten years. She was on Cymbalta from July to September 2021. She thinks she has tried lexapro as well. She has been on Wellbutrin for 2 years, does not think it is working. She doubled her current seroquel dose once and slept well throughout the night. Lunesta 3mg nightly and trazodone 100mg nightly were both ineffective for insomnia. She thinks she tried Prozac and it did not help. Together we tried Zoloft and prazosin. She thinks the prazosin may have helped.    Manic/Hypomanic Symptom Screening:  Since the last session, have you had any periods lasting at least a few days where your energy level was higher than normal and you did not need as much sleep at night to function the following day?: Denies    Substance Use Screening:  Cannabis: She uses medical marijuana tinctures 4 times daily  Does not use others.    Review of  Measures  PHQ9 Score:              27  GAD7 Score:              21    Scores from last session:   PHQ9 Score:              27  GAD7 Score:              21    PHQ9 Score:              27  GAD7 Score:              21    PHQ-9:   23  MARQUITA-7:   21  MOCA:   17/30    Scores from initial visit:  PHQ9 Score:              21/27  GAD7 Score:              21/21     Review of PDMP  Reviewed    Constitutional     VITAL SIGNS  Temp 97.4 °F (36.3 °C)   BP (!) 142/77    HR 77    RR      SpO2           No results found for this or any previous visit (from the past 672 hour(s)).     MENTAL STATUS EXAM  General:  Alert and oriented x 4 Appearance is good grooming and hygiene, appears stated age, BMI 31. Behavior: cooperative, eye contact normal  Gait, Posture and Muscle Strength/Tone: Stooped posture. Ambulates with walker. No gross abnormal movements noted.  Psychomotor Agitation and Retardation: none evident. AIMS 0  Speech: Normal rate, volume, articulation, and tone.  Mood: +depression  Affect: dysthymic  Thought Process: Needs redirection back to question. Some tangentiality. No flight of ideas.  Associations:  Intact. No loosening of associations.  Thought content: Denies suicidal and homicidal thoughts, plans and intentions.  Perceptions: Denies any auditory or visual hallucinations at present. Does not appear internally preoccupied.  Orientation: Alert and oriented to person, place, date and situation  Attention and Concentration: Intact.   Memory: Has trouble with remembering the start and stop dates of medications  Language: No deficits noted    Fund of Knowledge: appropriate for age and level and education.   Insight:   good  Judgment: good  Impulse control: good      ASSESSMENT  Problem List Items Addressed This Visit          Psychiatric    Current moderate episode of major depressive disorder - Primary       Medication options are difficult due to patient's existing medical conditions and medication regimen. I think  patient would benefit from therapy, but she is resistant to trying. She might do well in IOP.      PLAN  Patient Instructions   Continue Seroquel 300mg nightly.  Increase lamictal from 25mg to 50mg daily for another two weeks. Please contact Dr. Mckeon if you see any new rashes when starting this medication.     Follow up in 2 weeks      -------------------------------------------------------------------------------    Joanne Mckeon  Buddh[facility]  PSYCHIATRY    Today's encounter took a total time of 30 minutes, and that time included interview and documentation, ordering medication.    Risks, Benefits, Side Effects and Alternatives were discussed with the patient today and consent was obtained for the current medication regimen. The patient was encouraged to ask questions and these questions were answered and the patient was engaged in psychoeducation regarding diagnosis, course of illness, accuracy of the diagnosis, and other general elements regarding overall diagnosis and treatment consideration.     Any medications being used off-label were discussed with the patient inclusive of the evidence base for the use of the medications and consent was obtained for the off-label use of the medication.     Brief suicide risk assessment was performed with the patient today and safety planning was performed if indicated.    Note completed by: Joanne Mckeon MD, 3/21/2023 3:41 PM

## 2023-03-21 NOTE — Clinical Note
This patient has been severely depressed and anxious and medication options are limited. I think she would respond well to a group setting, because I think she is quite lonely.

## 2023-03-29 ENCOUNTER — TELEPHONE (OUTPATIENT)
Dept: PSYCHIATRY | Facility: CLINIC | Age: 53
End: 2023-03-29
Payer: MEDICAID

## 2023-03-29 NOTE — TELEPHONE ENCOUNTER
----- Message from Joanne Mckeon MD sent at 3/29/2023 11:35 AM CDT -----  Hi Meenakshi,    Please call her and let her know to stop the medication, and let her know that is she starts to feel feverish or spike a temperature or the rash spreads alarmingly to go the ED. Document the phone call with this information listed please.    Joanne  ----- Message -----  From: Meenakshi Martinez MA  Sent: 3/29/2023   8:57 AM CDT  To: Joanne Mckeon MD    Patient Listed above called in because she's starting to break out from medication. She stated you told her to give the office a call if she begins to break out.    Thank you  Meenakshi

## 2023-04-04 ENCOUNTER — PATIENT MESSAGE (OUTPATIENT)
Dept: RESEARCH | Facility: HOSPITAL | Age: 53
End: 2023-04-04
Payer: MEDICAID

## 2023-04-06 ENCOUNTER — PATIENT MESSAGE (OUTPATIENT)
Dept: PSYCHIATRY | Facility: CLINIC | Age: 53
End: 2023-04-06
Payer: MEDICAID

## 2023-04-06 NOTE — PROGRESS NOTES
Subsequent Psychiatric Session  53 y.o. female    Reason for Follow Up:   PTSD, MDD, and MARQUITA, cognitive disorder    Current Medications:    Current Outpatient Medications:     ascorbic acid, vitamin C, (VITAMIN C) 500 MG tablet, Take 1 tablet (500 mg total) by mouth once daily., Disp: , Rfl:     butalbital-acetaminophen-caffeine -40 mg (FIORICET, ESGIC) -40 mg per tablet, Take 1 tablet by mouth every 4 (four) hours as needed for Pain., Disp: , Rfl:     celecoxib (CELEBREX) 200 MG capsule, Take 200 mg by mouth., Disp: , Rfl:     cetirizine 10 mg chewable tablet, Take 10 mg by mouth once daily., Disp: , Rfl:     fluticasone furoate-vilanteroL (BREO) 100-25 mcg/dose diskus inhaler, Inhale 1 puff into the lungs once daily. , Disp: , Rfl:     HYDROcodone-acetaminophen (NORCO)  mg per tablet, Take 1 tablet by mouth every 6 (six) hours as needed for Pain., Disp: 10 tablet, Rfl: 0    ibuprofen (ADVIL,MOTRIN) 600 MG tablet, Take 1 tablet (600 mg total) by mouth every 6 (six) hours as needed for Pain., Disp: 20 tablet, Rfl: 0    LIDOcaine (LIDODERM) 5 %, Place 1 patch onto the skin once daily. Remove & Discard patch within 12 hours or as directed by MD, Disp: 15 patch, Rfl: 0    losartan (COZAAR) 25 MG tablet, Take 25 mg by mouth once daily., Disp: , Rfl:     meloxicam (MOBIC) 15 MG tablet, Take 15 mg by mouth once daily., Disp: , Rfl:     promethazine-phenylephrine (PROMETHAZINE VC) 6.25-5 mg/5 mL syrup, Take 1.25 mLs by mouth every 6 (six) hours as needed., Disp: , Rfl:     rizatriptan (MAXALT) 10 MG tablet, Take 10 mg by mouth as needed for Migraine., Disp: , Rfl:     tiZANidine 4 mg Cap, Take 4 mg by mouth 3 (three) times daily., Disp: , Rfl:     albuterol (PROVENTIL/VENTOLIN HFA) 90 mcg/actuation inhaler, Inhale 2 puffs into the lungs every 6 (six) hours as needed for Wheezing. , Disp: , Rfl:     amitriptyline (ELAVIL) 10 MG tablet, Take 1 tablet (10 mg total) by mouth nightly as needed for Insomnia.,  "Disp: 30 tablet, Rfl: 2    famotidine (PEPCID) 20 MG tablet, Take 20 mg by mouth 2 (two) times daily as needed. , Disp: , Rfl:     gabapentin (NEURONTIN) 300 MG capsule, Take 400 mg by mouth every evening., Disp: , Rfl:     QUEtiapine (SEROQUEL) 300 MG Tab, Take 1 tablet (300 mg total) by mouth every evening., Disp: 30 tablet, Rfl: 2  No current facility-administered medications for this visit.     Date of Last Visit:   03/21/23    In Clinic Since:   February 2022  Therapy:    Referred to [facility]    Interval History  Patient discusses her reaction to lamictal. The rash started underneath her breast and on her arm. She had an elevated temperature (?99*F.) The rash took "forever" to go away. It was a dark red color and circular. She went to the ED for pain last night. She had shoulder pain that radiated down her right arm for three days that did not respond to tylenol so she went to the ED. She has been referred to pain management for rheumatoid arthritis. She continues to have passive SI daily. She repeats that she would let her son or this writer know if she was having strong suicidal thoughts/urges. She denies any desire or plan to hurt anyone. She denies AH.    Her puppy's name is Kelli.     Medication Trials:                             She has been on Xanax 1mg daily for the past ten years. She was on Cymbalta from July to September 2021. She does not think that was helpful. he thinks she has tried lexapro as well. She has been on Wellbutrin for 2 years, does not think it is working. She doubled her current seroquel dose once and slept well throughout the night. Lunesta 3mg nightly and trazodone 100mg nightly were both ineffective for insomnia. She thinks she tried Prozac and it did not help. Together we tried Zoloft, prazosin, and lamictal. She had a rash due to lamictal. She thinks the prazosin may have helped.    Manic/Hypomanic Symptom Screening:  Since the last session, have you had any periods lasting at least " a few days where your energy level was higher than normal and you did not need as much sleep at night to function the following day?: Denies    Substance Use Screening:  Cannabis: She uses medical marijuana tinctures 4 times daily  Does not use others.    Review of Measures  PHQ9 Score:              27  GAD7 Score:              21    Scores from last session:   PHQ9 Score:              27  GAD7 Score:              21    PHQ9 Score:              27  GAD7 Score:              21    PHQ9 Score:              27  GAD7 Score:              21    PHQ-9:   23  MARQUITA-7:   21  MOCA:   17/30    Scores from initial visit:  PHQ9 Score:              21/27  GAD7 Score:              21/21     Review of PDMP  Reviewed    Constitutional     VITAL SIGNS  Temp 97.3 °F (36.3 °C)   BP (!) 159/70    HR 68    RR      SpO2           Recent Results (from the past 672 hour(s))   POCT urine pregnancy    Collection Time: 04/11/23  2:11 PM   Result Value Ref Range    POC Preg Test, Ur Negative Negative     Acceptable Yes         MENTAL STATUS EXAM  General:  Alert and oriented x 4 Appearance is good grooming and hygiene, appears stated age, BMI 31.40. Behavior: cooperative, eye contact normal  Gait, Posture and Muscle Strength/Tone: Stooped posture. Ambulates with walker. No gross abnormal movements noted.  Psychomotor Agitation and Retardation: none evident. AIMS 0  Speech: Normal rate, volume, articulation, and tone.  Mood: +depression  Affect: dysthymic  Thought Process: Linear and coherent. No flight of ideas.  Associations:  Intact. No loosening of associations.  Thought content: Denies suicidal and homicidal thoughts, plans and intentions.  Perceptions: Denies any auditory or visual hallucinations at present. Does not appear internally preoccupied.  Orientation: Alert and oriented to person, place, date and situation  Attention and Concentration: Intact.   Memory: Has trouble with remembering the start and stop dates of  medications  Language: No deficits noted    Fund of Knowledge: appropriate for age and level and education.   Insight:   good  Judgment: good  Impulse control: good      ASSESSMENT  Problem List Items Addressed This Visit          Psychiatric    Current moderate episode of major depressive disorder - Primary    Relevant Medications    QUEtiapine (SEROQUEL) 300 MG Tab    amitriptyline (ELAVIL) 10 MG tablet    MARQUITA (generalized anxiety disorder)    Relevant Medications    amitriptyline (ELAVIL) 10 MG tablet         Medication options are difficult due to patient's existing medical conditions and medication regimen. I think patient would benefit from therapy, but she is resistant to trying. She might do well in IOP. She wants to wait on IOP until after she gets established with pain management.      PLAN  Patient Instructions   Continue Seroquel 300mg nightly.  Start elavil 10mg nightly.     Follow up in 2 weeks      -------------------------------------------------------------------------------    Joanne Mckeon  [facility] [place] - PSYCHIATRY    Today's encounter took a total time of 30 minutes, and that time included interview and documentation, ordering medication.    Risks, Benefits, Side Effects and Alternatives were discussed with the patient today and consent was obtained for the current medication regimen. The patient was encouraged to ask questions and these questions were answered and the patient was engaged in psychoeducation regarding diagnosis, course of illness, accuracy of the diagnosis, and other general elements regarding overall diagnosis and treatment consideration.     Any medications being used off-label were discussed with the patient inclusive of the evidence base for the use of the medications and consent was obtained for the off-label use of the medication.     Brief suicide risk assessment was performed with the patient today and safety planning was performed if indicated.    Note completed by: Joanne  A MD Linda, 4/12/2023 3:41 PM

## 2023-04-11 ENCOUNTER — HOSPITAL ENCOUNTER (EMERGENCY)
Facility: OTHER | Age: 53
Discharge: HOME | End: 2023-04-11
Attending: EMERGENCY MEDICINE
Payer: MEDICAID

## 2023-04-11 VITALS
TEMPERATURE: 98 F | SYSTOLIC BLOOD PRESSURE: 108 MMHG | OXYGEN SATURATION: 99 % | HEIGHT: 65 IN | BODY MASS INDEX: 30.32 KG/M2 | HEART RATE: 67 BPM | WEIGHT: 182 LBS | DIASTOLIC BLOOD PRESSURE: 72 MMHG | RESPIRATION RATE: 20 BRPM

## 2023-04-11 DIAGNOSIS — Z87.39 HISTORY OF RHEUMATOID ARTHRITIS: ICD-10-CM

## 2023-04-11 DIAGNOSIS — M79.10 MYALGIA: ICD-10-CM

## 2023-04-11 DIAGNOSIS — M25.511 PAIN OF BOTH SHOULDER JOINTS: Primary | ICD-10-CM

## 2023-04-11 DIAGNOSIS — M25.512 PAIN OF BOTH SHOULDER JOINTS: Primary | ICD-10-CM

## 2023-04-11 DIAGNOSIS — M25.519 SHOULDER PAIN: ICD-10-CM

## 2023-04-11 DIAGNOSIS — R07.89 CHEST WALL PAIN: ICD-10-CM

## 2023-04-11 LAB
B-HCG UR QL: NEGATIVE
CTP QC/QA: YES

## 2023-04-11 PROCEDURE — 63600175 PHARM REV CODE 636 W HCPCS: Performed by: PHYSICIAN ASSISTANT

## 2023-04-11 PROCEDURE — 81025 URINE PREGNANCY TEST: CPT | Performed by: PHYSICIAN ASSISTANT

## 2023-04-11 PROCEDURE — 93005 ELECTROCARDIOGRAM TRACING: CPT

## 2023-04-11 PROCEDURE — 93010 ELECTROCARDIOGRAM REPORT: CPT | Mod: ,,, | Performed by: INTERNAL MEDICINE

## 2023-04-11 PROCEDURE — 96372 THER/PROPH/DIAG INJ SC/IM: CPT | Performed by: PHYSICIAN ASSISTANT

## 2023-04-11 PROCEDURE — 25000003 PHARM REV CODE 250: Performed by: PHYSICIAN ASSISTANT

## 2023-04-11 PROCEDURE — 93010 EKG 12-LEAD: ICD-10-PCS | Mod: ,,, | Performed by: INTERNAL MEDICINE

## 2023-04-11 PROCEDURE — 99284 EMERGENCY DEPT VISIT MOD MDM: CPT | Mod: 25

## 2023-04-11 RX ORDER — HYDROCODONE BITARTRATE AND ACETAMINOPHEN 10; 325 MG/1; MG/1
1 TABLET ORAL EVERY 6 HOURS PRN
Qty: 10 TABLET | Refills: 0 | Status: SHIPPED | OUTPATIENT
Start: 2023-04-11

## 2023-04-11 RX ORDER — LIDOCAINE 50 MG/G
2 PATCH TOPICAL
Status: DISCONTINUED | OUTPATIENT
Start: 2023-04-11 | End: 2023-04-11 | Stop reason: HOSPADM

## 2023-04-11 RX ORDER — LIDOCAINE 50 MG/G
1 PATCH TOPICAL DAILY
Qty: 15 PATCH | Refills: 0 | Status: SHIPPED | OUTPATIENT
Start: 2023-04-11

## 2023-04-11 RX ORDER — KETOROLAC TROMETHAMINE 30 MG/ML
30 INJECTION, SOLUTION INTRAMUSCULAR; INTRAVENOUS
Status: COMPLETED | OUTPATIENT
Start: 2023-04-11 | End: 2023-04-11

## 2023-04-11 RX ADMIN — KETOROLAC TROMETHAMINE 30 MG: 30 INJECTION, SOLUTION INTRAMUSCULAR; INTRAVENOUS at 02:04

## 2023-04-11 RX ADMIN — LIDOCAINE 2 PATCH: 50 PATCH CUTANEOUS at 02:04
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
## 2023-04-11 NOTE — ED TRIAGE NOTES
Pt reports pain in the right neck radiating into the right shoulder. PT with multiple other complaints. Pt is alert and oriented, ambulatory, respirations are even unlabored. Pt is in NAD
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
## 2023-04-11 NOTE — DISCHARGE INSTRUCTIONS
Please follow-up with pain management and your rheumatologist.  Use heating pad to the site.  Take medication as prescribed.  Only take Norco for breakthrough pain.  Return to the ER with concerning or worsening symptoms

## 2023-04-12 ENCOUNTER — OFFICE VISIT (OUTPATIENT)
Dept: PSYCHIATRY | Facility: CLINIC | Age: 53
End: 2023-04-12
Payer: MEDICAID

## 2023-04-12 VITALS
DIASTOLIC BLOOD PRESSURE: 70 MMHG | WEIGHT: 188.69 LBS | SYSTOLIC BLOOD PRESSURE: 159 MMHG | TEMPERATURE: 97 F | HEART RATE: 68 BPM | BODY MASS INDEX: 31.4 KG/M2

## 2023-04-12 DIAGNOSIS — F41.1 GAD (GENERALIZED ANXIETY DISORDER): ICD-10-CM

## 2023-04-12 DIAGNOSIS — F32.1 CURRENT MODERATE EPISODE OF MAJOR DEPRESSIVE DISORDER, UNSPECIFIED WHETHER RECURRENT: Primary | ICD-10-CM

## 2023-04-12 PROCEDURE — 1160F RVW MEDS BY RX/DR IN RCRD: CPT | Mod: AF,HB,CPTII, | Performed by: PSYCHIATRY & NEUROLOGY

## 2023-04-12 PROCEDURE — 3077F SYST BP >= 140 MM HG: CPT | Mod: AF,HB,CPTII, | Performed by: PSYCHIATRY & NEUROLOGY

## 2023-04-12 PROCEDURE — 99999 PR PBB SHADOW E&M-EST. PATIENT-LVL III: CPT | Mod: PBBFAC,AF,HB, | Performed by: PSYCHIATRY & NEUROLOGY

## 2023-04-12 PROCEDURE — 99213 OFFICE O/P EST LOW 20 MIN: CPT | Mod: PBBFAC | Performed by: PSYCHIATRY & NEUROLOGY

## 2023-04-12 PROCEDURE — 3008F BODY MASS INDEX DOCD: CPT | Mod: AF,HB,CPTII, | Performed by: PSYCHIATRY & NEUROLOGY

## 2023-04-12 PROCEDURE — 4010F PR ACE/ARB THEARPY RXD/TAKEN: ICD-10-PCS | Mod: AF,HB,CPTII, | Performed by: PSYCHIATRY & NEUROLOGY

## 2023-04-12 PROCEDURE — 1160F PR REVIEW ALL MEDS BY PRESCRIBER/CLIN PHARMACIST DOCUMENTED: ICD-10-PCS | Mod: AF,HB,CPTII, | Performed by: PSYCHIATRY & NEUROLOGY

## 2023-04-12 PROCEDURE — 1159F MED LIST DOCD IN RCRD: CPT | Mod: AF,HB,CPTII, | Performed by: PSYCHIATRY & NEUROLOGY

## 2023-04-12 PROCEDURE — 99214 PR OFFICE/OUTPT VISIT, EST, LEVL IV, 30-39 MIN: ICD-10-PCS | Mod: S$PBB,AF,HB, | Performed by: PSYCHIATRY & NEUROLOGY

## 2023-04-12 PROCEDURE — 4010F ACE/ARB THERAPY RXD/TAKEN: CPT | Mod: AF,HB,CPTII, | Performed by: PSYCHIATRY & NEUROLOGY

## 2023-04-12 PROCEDURE — 3078F DIAST BP <80 MM HG: CPT | Mod: AF,HB,CPTII, | Performed by: PSYCHIATRY & NEUROLOGY

## 2023-04-12 PROCEDURE — 99214 OFFICE O/P EST MOD 30 MIN: CPT | Mod: S$PBB,AF,HB, | Performed by: PSYCHIATRY & NEUROLOGY

## 2023-04-12 PROCEDURE — 3008F PR BODY MASS INDEX (BMI) DOCUMENTED: ICD-10-PCS | Mod: AF,HB,CPTII, | Performed by: PSYCHIATRY & NEUROLOGY

## 2023-04-12 PROCEDURE — 3078F PR MOST RECENT DIASTOLIC BLOOD PRESSURE < 80 MM HG: ICD-10-PCS | Mod: AF,HB,CPTII, | Performed by: PSYCHIATRY & NEUROLOGY

## 2023-04-12 PROCEDURE — 99999 PR PBB SHADOW E&M-EST. PATIENT-LVL III: ICD-10-PCS | Mod: PBBFAC,AF,HB, | Performed by: PSYCHIATRY & NEUROLOGY

## 2023-04-12 PROCEDURE — 3077F PR MOST RECENT SYSTOLIC BLOOD PRESSURE >= 140 MM HG: ICD-10-PCS | Mod: AF,HB,CPTII, | Performed by: PSYCHIATRY & NEUROLOGY

## 2023-04-12 PROCEDURE — 1159F PR MEDICATION LIST DOCUMENTED IN MEDICAL RECORD: ICD-10-PCS | Mod: AF,HB,CPTII, | Performed by: PSYCHIATRY & NEUROLOGY

## 2023-04-12 RX ORDER — AMITRIPTYLINE HYDROCHLORIDE 10 MG/1
10 TABLET, FILM COATED ORAL NIGHTLY PRN
Qty: 30 TABLET | Refills: 2 | Status: SHIPPED | OUTPATIENT
Start: 2023-04-12 | End: 2023-04-26

## 2023-04-12 RX ORDER — QUETIAPINE FUMARATE 300 MG/1
300 TABLET, FILM COATED ORAL NIGHTLY
Qty: 30 TABLET | Refills: 2 | Status: SHIPPED | OUTPATIENT
Start: 2023-04-12 | End: 2023-06-06

## 2023-04-19 ENCOUNTER — PATIENT MESSAGE (OUTPATIENT)
Dept: PSYCHIATRY | Facility: CLINIC | Age: 53
End: 2023-04-19
Payer: MEDICAID

## 2023-04-24 NOTE — PROGRESS NOTES
Subsequent Psychiatric Session  53 y.o. female    Reason for Follow Up:   PTSD, MDD, and MARQUITA, cognitive disorder    Current Medications:    Current Outpatient Medications:     ascorbic acid, vitamin C, (VITAMIN C) 500 MG tablet, Take 1 tablet (500 mg total) by mouth once daily., Disp: , Rfl:     celecoxib (CELEBREX) 200 MG capsule, Take 200 mg by mouth., Disp: , Rfl:     cetirizine 10 mg chewable tablet, Take 10 mg by mouth once daily., Disp: , Rfl:     fluticasone furoate-vilanteroL (BREO) 100-25 mcg/dose diskus inhaler, Inhale 1 puff into the lungs once daily. , Disp: , Rfl:     HYDROcodone-acetaminophen (NORCO)  mg per tablet, Take 1 tablet by mouth every 6 (six) hours as needed for Pain., Disp: 10 tablet, Rfl: 0    ibuprofen (ADVIL,MOTRIN) 600 MG tablet, Take 1 tablet (600 mg total) by mouth every 6 (six) hours as needed for Pain., Disp: 20 tablet, Rfl: 0    LIDOcaine (LIDODERM) 5 %, Place 1 patch onto the skin once daily. Remove & Discard patch within 12 hours or as directed by MD, Disp: 15 patch, Rfl: 0    losartan (COZAAR) 25 MG tablet, Take 25 mg by mouth once daily., Disp: , Rfl:     meloxicam (MOBIC) 15 MG tablet, Take 15 mg by mouth once daily., Disp: , Rfl:     promethazine-phenylephrine (PROMETHAZINE VC) 6.25-5 mg/5 mL syrup, Take 1.25 mLs by mouth every 6 (six) hours as needed., Disp: , Rfl:     propranoloL (INDERAL) 10 MG tablet, Take 10 mg by mouth Daily., Disp: , Rfl:     QUEtiapine (SEROQUEL) 300 MG Tab, Take 1 tablet (300 mg total) by mouth every evening., Disp: 30 tablet, Rfl: 2    tiZANidine 4 mg Cap, Take 4 mg by mouth 3 (three) times daily., Disp: , Rfl:     albuterol (PROVENTIL/VENTOLIN HFA) 90 mcg/actuation inhaler, Inhale 2 puffs into the lungs every 6 (six) hours as needed for Wheezing. , Disp: , Rfl:     amitriptyline (ELAVIL) 25 MG tablet, Take 1 tablet (25 mg total) by mouth nightly as needed for Insomnia., Disp: 30 tablet, Rfl: 2    famotidine (PEPCID) 20 MG tablet, Take 20 mg by  "mouth 2 (two) times daily as needed. , Disp: , Rfl:     gabapentin (NEURONTIN) 300 MG capsule, Take 400 mg by mouth every evening., Disp: , Rfl:      Date of Last Visit:   04/12/23    In Clinic Since:   February 2022  Therapy:    Referred to IOP    Interval History  Patient feels "exhausted." She thinks she is up to 11 doctors. She is on a lot of medications. She has had two instances over the past two months, where she has left the gas on. She is forgetting a lot. Her hands are trembling. She is dropping things. She has been taken off the triptan. Her dosage of gabapentin was increased to 400mg nightly about six weeks ago. The pain she has without it is "unbearable." She was given some norco to help with pain. She is still hallucinating a lot at night.     Her puppy's name is Kelli.     Medication Trials:                             She has been on Xanax 1mg daily for the past ten years. She was on Cymbalta from July to September 2021. She does not think that was helpful. he thinks she has tried lexapro as well. She has been on Wellbutrin for 2 years, does not think it is working. She doubled her current seroquel dose once and slept well throughout the night. Lunesta 3mg nightly and trazodone 100mg nightly were both ineffective for insomnia. She thinks she tried Prozac and it did not help. Together we tried Zoloft, prazosin, and lamictal. She had a rash due to lamictal. She thinks the prazosin may have helped.    Manic/Hypomanic Symptom Screening:  Since the last session, have you had any periods lasting at least a few days where your energy level was higher than normal and you did not need as much sleep at night to function the following day?: Denies    Substance Use Screening:  Cannabis: She uses medical marijuana tinctures 4 times daily  Does not use others.    Review of Measures  PHQ9 Score:              27  GAD7 Score:              21    Scores from last session:   PHQ9 Score:              27  GAD7 Score:      "         21    PHQ9 Score:              27  GAD7 Score:              21    PHQ9 Score:              27  GAD7 Score:              21    PHQ9 Score:              27  GAD7 Score:              21    PHQ-9:   23  MARQUITA-7:   21  MOCA:   17/30    Scores from initial visit:  PHQ9 Score:              21/27  GAD7 Score:              21/21     Review of PDMP  Reviewed    Constitutional     VITAL SIGNS  Temp 98.3 °F (36.8 °C)   /76    HR 99    RR      SpO2           Recent Results (from the past 672 hour(s))   POCT urine pregnancy    Collection Time: 04/11/23  2:11 PM   Result Value Ref Range    POC Preg Test, Ur Negative Negative     Acceptable Yes         MENTAL STATUS EXAM  General:  Alert and oriented x 4 Appearance is good grooming and hygiene, appears stated age, BMI 32.10. Behavior: cooperative, eye contact normal  Gait, Posture and Muscle Strength/Tone: Stooped posture. Ambulates with walker. No gross abnormal movements noted.  Psychomotor Agitation and Retardation: none evident.  Speech: Normal rate, volume, articulation, and tone.  Mood: +depression  Affect: dysthymic  Thought Process: Linear and coherent. No flight of ideas.  Associations:  Intact. No loosening of associations.  Thought content: Denies suicidal and homicidal thoughts, plans and intentions.  Perceptions: Denies any auditory or visual hallucinations at present. Does not appear internally preoccupied.  Orientation: Alert and oriented to person, place, date and situation  Attention and Concentration: Intact.   Memory: Has trouble with remembering the start and stop dates of medications  Language: No deficits noted    Fund of Knowledge: appropriate for age and level and education.   Insight:   good  Judgment: good  Impulse control: good      ASSESSMENT  Problem List Items Addressed This Visit          Psychiatric    Current moderate episode of major depressive disorder    Relevant Medications    amitriptyline (ELAVIL) 25 MG tablet    MARQUITA  (generalized anxiety disorder)    Relevant Medications    amitriptyline (ELAVIL) 25 MG tablet           Medication options are difficult due to patient's existing medical conditions and medication regimen. I think patient would benefit from therapy, but she is resistant to trying. Patient refuses again today, saying therapy in the past has only made her sad. Her son was supposed to come to today's visit, but he was unable to.      PLAN  Patient Instructions   Recommend looking DVDs and finding some that bring deepa.  Increase Elavil from 10mg to 25mg nightly  Continue Seroquel 300mg nightly.    Follow up in May and June.      -------------------------------------------------------------------------------    Joanne Mckeon  [facility] - PSYCHIATRY    Today's encounter took a total time of 30 minutes, and that time included interview and documentation, ordering medication.    Risks, Benefits, Side Effects and Alternatives were discussed with the patient today and consent was obtained for the current medication regimen. The patient was encouraged to ask questions and these questions were answered and the patient was engaged in psychoeducation regarding diagnosis, course of illness, accuracy of the diagnosis, and other general elements regarding overall diagnosis and treatment consideration.     Any medications being used off-label were discussed with the patient inclusive of the evidence base for the use of the medications and consent was obtained for the off-label use of the medication.     Brief suicide risk assessment was performed with the patient today and safety planning was performed if indicated.    Note completed by: Joanne Mckeon MD, 4/26/2023 3:41 PM

## 2023-04-26 ENCOUNTER — OFFICE VISIT (OUTPATIENT)
Dept: PSYCHIATRY | Facility: CLINIC | Age: 53
End: 2023-04-26
Payer: MEDICAID

## 2023-04-26 VITALS
DIASTOLIC BLOOD PRESSURE: 76 MMHG | WEIGHT: 192.88 LBS | HEART RATE: 99 BPM | SYSTOLIC BLOOD PRESSURE: 135 MMHG | TEMPERATURE: 98 F | BODY MASS INDEX: 32.1 KG/M2

## 2023-04-26 DIAGNOSIS — F41.1 GAD (GENERALIZED ANXIETY DISORDER): ICD-10-CM

## 2023-04-26 DIAGNOSIS — F32.1 CURRENT MODERATE EPISODE OF MAJOR DEPRESSIVE DISORDER, UNSPECIFIED WHETHER RECURRENT: ICD-10-CM

## 2023-04-26 PROCEDURE — 99214 OFFICE O/P EST MOD 30 MIN: CPT | Mod: S$PBB,AF,HB, | Performed by: PSYCHIATRY & NEUROLOGY

## 2023-04-26 PROCEDURE — 4010F PR ACE/ARB THEARPY RXD/TAKEN: ICD-10-PCS | Mod: AF,HB,CPTII, | Performed by: PSYCHIATRY & NEUROLOGY

## 2023-04-26 PROCEDURE — 4010F ACE/ARB THERAPY RXD/TAKEN: CPT | Mod: AF,HB,CPTII, | Performed by: PSYCHIATRY & NEUROLOGY

## 2023-04-26 PROCEDURE — 3008F PR BODY MASS INDEX (BMI) DOCUMENTED: ICD-10-PCS | Mod: AF,HB,CPTII, | Performed by: PSYCHIATRY & NEUROLOGY

## 2023-04-26 PROCEDURE — 3078F PR MOST RECENT DIASTOLIC BLOOD PRESSURE < 80 MM HG: ICD-10-PCS | Mod: AF,HB,CPTII, | Performed by: PSYCHIATRY & NEUROLOGY

## 2023-04-26 PROCEDURE — 1159F PR MEDICATION LIST DOCUMENTED IN MEDICAL RECORD: ICD-10-PCS | Mod: AF,HB,CPTII, | Performed by: PSYCHIATRY & NEUROLOGY

## 2023-04-26 PROCEDURE — 3078F DIAST BP <80 MM HG: CPT | Mod: AF,HB,CPTII, | Performed by: PSYCHIATRY & NEUROLOGY

## 2023-04-26 PROCEDURE — 99999 PR PBB SHADOW E&M-EST. PATIENT-LVL III: CPT | Mod: PBBFAC,AF,HB, | Performed by: PSYCHIATRY & NEUROLOGY

## 2023-04-26 PROCEDURE — 3075F PR MOST RECENT SYSTOLIC BLOOD PRESS GE 130-139MM HG: ICD-10-PCS | Mod: AF,HB,CPTII, | Performed by: PSYCHIATRY & NEUROLOGY

## 2023-04-26 PROCEDURE — 99213 OFFICE O/P EST LOW 20 MIN: CPT | Mod: PBBFAC | Performed by: PSYCHIATRY & NEUROLOGY

## 2023-04-26 PROCEDURE — 1159F MED LIST DOCD IN RCRD: CPT | Mod: AF,HB,CPTII, | Performed by: PSYCHIATRY & NEUROLOGY

## 2023-04-26 PROCEDURE — 1160F RVW MEDS BY RX/DR IN RCRD: CPT | Mod: AF,HB,CPTII, | Performed by: PSYCHIATRY & NEUROLOGY

## 2023-04-26 PROCEDURE — 99999 PR PBB SHADOW E&M-EST. PATIENT-LVL III: ICD-10-PCS | Mod: PBBFAC,AF,HB, | Performed by: PSYCHIATRY & NEUROLOGY

## 2023-04-26 PROCEDURE — 99214 PR OFFICE/OUTPT VISIT, EST, LEVL IV, 30-39 MIN: ICD-10-PCS | Mod: S$PBB,AF,HB, | Performed by: PSYCHIATRY & NEUROLOGY

## 2023-04-26 PROCEDURE — 1160F PR REVIEW ALL MEDS BY PRESCRIBER/CLIN PHARMACIST DOCUMENTED: ICD-10-PCS | Mod: AF,HB,CPTII, | Performed by: PSYCHIATRY & NEUROLOGY

## 2023-04-26 PROCEDURE — 3075F SYST BP GE 130 - 139MM HG: CPT | Mod: AF,HB,CPTII, | Performed by: PSYCHIATRY & NEUROLOGY

## 2023-04-26 PROCEDURE — 3008F BODY MASS INDEX DOCD: CPT | Mod: AF,HB,CPTII, | Performed by: PSYCHIATRY & NEUROLOGY

## 2023-04-26 RX ORDER — PROPRANOLOL HYDROCHLORIDE 10 MG/1
10 TABLET ORAL 2 TIMES DAILY
COMMUNITY

## 2023-04-26 RX ORDER — AMITRIPTYLINE HYDROCHLORIDE 25 MG/1
25 TABLET, FILM COATED ORAL NIGHTLY PRN
Qty: 30 TABLET | Refills: 2 | Status: SHIPPED | OUTPATIENT
Start: 2023-04-26 | End: 2023-06-06

## 2023-04-26 NOTE — PATIENT INSTRUCTIONS
Recommend looking DVDs and finding some that bring deepa.  Increase Elavil from 10mg to 25mg nightly  Continue Seroquel 300mg nightly.    Follow up in May and June.

## 2023-06-06 ENCOUNTER — OFFICE VISIT (OUTPATIENT)
Dept: PSYCHIATRY | Facility: CLINIC | Age: 53
End: 2023-06-06
Payer: MEDICAID

## 2023-06-06 VITALS
TEMPERATURE: 97 F | HEART RATE: 84 BPM | WEIGHT: 188.94 LBS | DIASTOLIC BLOOD PRESSURE: 81 MMHG | BODY MASS INDEX: 31.44 KG/M2 | SYSTOLIC BLOOD PRESSURE: 165 MMHG

## 2023-06-06 DIAGNOSIS — F32.1 CURRENT MODERATE EPISODE OF MAJOR DEPRESSIVE DISORDER, UNSPECIFIED WHETHER RECURRENT: Primary | ICD-10-CM

## 2023-06-06 DIAGNOSIS — F09 COGNITIVE DISORDER: ICD-10-CM

## 2023-06-06 DIAGNOSIS — F43.10 PTSD (POST-TRAUMATIC STRESS DISORDER): ICD-10-CM

## 2023-06-06 DIAGNOSIS — F41.1 GAD (GENERALIZED ANXIETY DISORDER): ICD-10-CM

## 2023-06-06 PROCEDURE — 4010F PR ACE/ARB THEARPY RXD/TAKEN: ICD-10-PCS | Mod: AF,HB,CPTII, | Performed by: PSYCHIATRY & NEUROLOGY

## 2023-06-06 PROCEDURE — 3077F SYST BP >= 140 MM HG: CPT | Mod: AF,HB,CPTII, | Performed by: PSYCHIATRY & NEUROLOGY

## 2023-06-06 PROCEDURE — 3079F PR MOST RECENT DIASTOLIC BLOOD PRESSURE 80-89 MM HG: ICD-10-PCS | Mod: AF,HB,CPTII, | Performed by: PSYCHIATRY & NEUROLOGY

## 2023-06-06 PROCEDURE — 99214 OFFICE O/P EST MOD 30 MIN: CPT | Mod: S$PBB,AF,HB, | Performed by: PSYCHIATRY & NEUROLOGY

## 2023-06-06 PROCEDURE — 1160F PR REVIEW ALL MEDS BY PRESCRIBER/CLIN PHARMACIST DOCUMENTED: ICD-10-PCS | Mod: AF,HB,CPTII, | Performed by: PSYCHIATRY & NEUROLOGY

## 2023-06-06 PROCEDURE — 4010F ACE/ARB THERAPY RXD/TAKEN: CPT | Mod: AF,HB,CPTII, | Performed by: PSYCHIATRY & NEUROLOGY

## 2023-06-06 PROCEDURE — 3077F PR MOST RECENT SYSTOLIC BLOOD PRESSURE >= 140 MM HG: ICD-10-PCS | Mod: AF,HB,CPTII, | Performed by: PSYCHIATRY & NEUROLOGY

## 2023-06-06 PROCEDURE — 99213 OFFICE O/P EST LOW 20 MIN: CPT | Mod: PBBFAC | Performed by: PSYCHIATRY & NEUROLOGY

## 2023-06-06 PROCEDURE — 99999 PR PBB SHADOW E&M-EST. PATIENT-LVL III: ICD-10-PCS | Mod: PBBFAC,AF,HB, | Performed by: PSYCHIATRY & NEUROLOGY

## 2023-06-06 PROCEDURE — 99999 PR PBB SHADOW E&M-EST. PATIENT-LVL III: CPT | Mod: PBBFAC,AF,HB, | Performed by: PSYCHIATRY & NEUROLOGY

## 2023-06-06 PROCEDURE — 3008F BODY MASS INDEX DOCD: CPT | Mod: AF,HB,CPTII, | Performed by: PSYCHIATRY & NEUROLOGY

## 2023-06-06 PROCEDURE — 1159F MED LIST DOCD IN RCRD: CPT | Mod: AF,HB,CPTII, | Performed by: PSYCHIATRY & NEUROLOGY

## 2023-06-06 PROCEDURE — 3079F DIAST BP 80-89 MM HG: CPT | Mod: AF,HB,CPTII, | Performed by: PSYCHIATRY & NEUROLOGY

## 2023-06-06 PROCEDURE — 3008F PR BODY MASS INDEX (BMI) DOCUMENTED: ICD-10-PCS | Mod: AF,HB,CPTII, | Performed by: PSYCHIATRY & NEUROLOGY

## 2023-06-06 PROCEDURE — 99214 PR OFFICE/OUTPT VISIT, EST, LEVL IV, 30-39 MIN: ICD-10-PCS | Mod: S$PBB,AF,HB, | Performed by: PSYCHIATRY & NEUROLOGY

## 2023-06-06 PROCEDURE — 1159F PR MEDICATION LIST DOCUMENTED IN MEDICAL RECORD: ICD-10-PCS | Mod: AF,HB,CPTII, | Performed by: PSYCHIATRY & NEUROLOGY

## 2023-06-06 PROCEDURE — 1160F RVW MEDS BY RX/DR IN RCRD: CPT | Mod: AF,HB,CPTII, | Performed by: PSYCHIATRY & NEUROLOGY

## 2023-06-06 RX ORDER — QUETIAPINE FUMARATE 300 MG/1
300 TABLET, FILM COATED ORAL NIGHTLY
Qty: 30 TABLET | Refills: 11 | Status: SHIPPED | OUTPATIENT
Start: 2023-06-06 | End: 2023-11-01 | Stop reason: SDUPTHER

## 2023-06-06 RX ORDER — AMITRIPTYLINE HYDROCHLORIDE 50 MG/1
50 TABLET, FILM COATED ORAL NIGHTLY PRN
Qty: 30 TABLET | Refills: 11 | Status: SHIPPED | OUTPATIENT
Start: 2023-06-06 | End: 2023-11-01

## 2023-06-06 NOTE — PATIENT INSTRUCTIONS
Increase Elavil from 25mg to 50mg nightly  Continue Seroquel 300mg nightly.     Transition to July 26th at 1pm

## 2023-06-06 NOTE — PROGRESS NOTES
Subsequent Psychiatric Session  53 y.o. female    Reason for Follow Up:   PTSD, MDD, and MARQUITA, cognitive disorder    Current Medications:    Current Outpatient Medications:     ascorbic acid, vitamin C, (VITAMIN C) 500 MG tablet, Take 1 tablet (500 mg total) by mouth once daily., Disp: , Rfl:     celecoxib (CELEBREX) 200 MG capsule, Take 200 mg by mouth., Disp: , Rfl:     cetirizine 10 mg chewable tablet, Take 10 mg by mouth once daily., Disp: , Rfl:     fluticasone furoate-vilanteroL (BREO) 100-25 mcg/dose diskus inhaler, Inhale 1 puff into the lungs once daily. , Disp: , Rfl:     HYDROcodone-acetaminophen (NORCO)  mg per tablet, Take 1 tablet by mouth every 6 (six) hours as needed for Pain., Disp: 10 tablet, Rfl: 0    ibuprofen (ADVIL,MOTRIN) 600 MG tablet, Take 1 tablet (600 mg total) by mouth every 6 (six) hours as needed for Pain., Disp: 20 tablet, Rfl: 0    LIDOcaine (LIDODERM) 5 %, Place 1 patch onto the skin once daily. Remove & Discard patch within 12 hours or as directed by MD, Disp: 15 patch, Rfl: 0    losartan (COZAAR) 25 MG tablet, Take 25 mg by mouth once daily., Disp: , Rfl:     meloxicam (MOBIC) 15 MG tablet, Take 15 mg by mouth once daily., Disp: , Rfl:     promethazine-phenylephrine (PROMETHAZINE VC) 6.25-5 mg/5 mL syrup, Take 1.25 mLs by mouth every 6 (six) hours as needed., Disp: , Rfl:     propranoloL (INDERAL) 10 MG tablet, Take 10 mg by mouth Daily., Disp: , Rfl:     tiZANidine 4 mg Cap, Take 4 mg by mouth 3 (three) times daily., Disp: , Rfl:     albuterol (PROVENTIL/VENTOLIN HFA) 90 mcg/actuation inhaler, Inhale 2 puffs into the lungs every 6 (six) hours as needed for Wheezing. , Disp: , Rfl:     amitriptyline (ELAVIL) 50 MG tablet, Take 1 tablet (50 mg total) by mouth nightly as needed for Insomnia., Disp: 30 tablet, Rfl: 11    famotidine (PEPCID) 20 MG tablet, Take 20 mg by mouth 2 (two) times daily as needed. , Disp: , Rfl:     gabapentin (NEURONTIN) 300 MG capsule, Take 400 mg by  "mouth every evening., Disp: , Rfl:     QUEtiapine (SEROQUEL) 300 MG Tab, Take 1 tablet (300 mg total) by mouth every evening., Disp: 30 tablet, Rfl: 11     Date of Last Visit:   04/26/23    In Clinic Since:   February 2022  Therapy:    Referred to IOP    Interval History  Patient describes mood as "too overwhelmed." She has been seeing butterflies or birds last night with the lights off. They disappear with the lights on. She is upset about her weight gain. She is on prednisone currently. She has chronic, passive suicidal thoughts without intent or plan. She denies HI, AH.    Her puppy's name is Kelli.     Medication Trials:                             She has been on Xanax 1mg daily for the past ten years. She was on Cymbalta from July to September 2021. She does not think that was helpful. he thinks she has tried lexapro as well. She has been on Wellbutrin for 2 years, does not think it is working. She doubled her current seroquel dose once and slept well throughout the night. Lunesta 3mg nightly and trazodone 100mg nightly were both ineffective for insomnia. She thinks she tried Prozac and it did not help. Together we tried Zoloft, prazosin, and lamictal. She had a rash due to lamictal. She thinks the prazosin may have helped.    Manic/Hypomanic Symptom Screening:  Since the last session, have you had any periods lasting at least a few days where your energy level was higher than normal and you did not need as much sleep at night to function the following day?: Denies    Substance Use Screening:  Cannabis: She uses medical marijuana tinctures 4 times daily  Does not use others.    Review of Measures  PHQ9 Score:              27  GAD7 Score:              21    Scores from last session:   PHQ9 Score:              27  GAD7 Score:              21    PHQ9 Score:              27  GAD7 Score:              21    PHQ9 Score:              27  GAD7 Score:              21    PHQ9 Score:              27  GAD7 Score:           "    21    PHQ9 Score:              27  GAD7 Score:              21    PHQ-9:   23  MARQUITA-7:   21  MOCA:   17/30    Scores from initial visit:  PHQ9 Score:              21/27  GAD7 Score:              21/21     Review of PDMP  Reviewed    Constitutional     VITAL SIGNS  Temp 97.4 °F (36.3 °C)   BP (!) 165/81    HR 84    RR      SpO2           No results found for this or any previous visit (from the past 672 hour(s)).       MENTAL STATUS EXAM  General:  Alert and oriented x 4 Appearance is good grooming and hygiene, appears stated age, BMI 31.44. Behavior: cooperative, eye contact normal  Gait, Posture and Muscle Strength/Tone: Stooped posture. Ambulates with walker. No gross abnormal movements noted.  Psychomotor Agitation and Retardation: none evident.  Speech: Normal rate, volume, articulation, and tone.  Mood: +depression  Affect: dysthymic  Thought Process: Linear and coherent. No flight of ideas.  Associations:  Intact. No loosening of associations.  Thought content: Denies suicidal and homicidal thoughts, plans and intentions.  Perceptions: Denies any auditory or visual hallucinations at present. Does not appear internally preoccupied.  Orientation: Alert and oriented to person, place, date and situation  Attention and Concentration: Intact.   Memory: Has trouble with remembering the start and stop dates of medications  Language: No deficits noted    Fund of Knowledge: appropriate for age and level and education.   Insight:   good  Judgment: good  Impulse control: good      ASSESSMENT  Problem List Items Addressed This Visit          Neuro    Cognitive disorder       Psychiatric    PTSD (post-traumatic stress disorder)    Current moderate episode of major depressive disorder - Primary    Relevant Medications    QUEtiapine (SEROQUEL) 300 MG Tab    amitriptyline (ELAVIL) 50 MG tablet    MARQUITA (generalized anxiety disorder)    Relevant Medications    amitriptyline (ELAVIL) 50 MG tablet       Medication options are  difficult due to patient's existing medical conditions and medication regimen. I think patient would benefit from therapy, but she is resistant to trying.       PLAN  Patient Instructions   Increase Elavil from 25mg to 50mg nightly  Continue Seroquel 300mg nightly.     Transition to July 26th at 1pm        -------------------------------------------------------------------------------    Joanne Mckeon  [facility] - PSYCHIATRY    Today's encounter took a total time of 30 minutes, and that time included interview and documentation, ordering medication.    Risks, Benefits, Side Effects and Alternatives were discussed with the patient today and consent was obtained for the current medication regimen. The patient was encouraged to ask questions and these questions were answered and the patient was engaged in psychoeducation regarding diagnosis, course of illness, accuracy of the diagnosis, and other general elements regarding overall diagnosis and treatment consideration.     Any medications being used off-label were discussed with the patient inclusive of the evidence base for the use of the medications and consent was obtained for the off-label use of the medication.     Brief suicide risk assessment was performed with the patient today and safety planning was performed if indicated.    Note completed by: Joanne Mckeon MD, 6/6/2023 3:41 PM

## 2023-11-01 ENCOUNTER — OFFICE VISIT (OUTPATIENT)
Dept: PSYCHIATRY | Facility: CLINIC | Age: 53
End: 2023-11-01
Payer: MEDICAID

## 2023-11-01 VITALS
WEIGHT: 186.94 LBS | SYSTOLIC BLOOD PRESSURE: 138 MMHG | BODY MASS INDEX: 31.11 KG/M2 | DIASTOLIC BLOOD PRESSURE: 82 MMHG | HEART RATE: 69 BPM

## 2023-11-01 DIAGNOSIS — F09 COGNITIVE DISORDER: ICD-10-CM

## 2023-11-01 DIAGNOSIS — F43.10 PTSD (POST-TRAUMATIC STRESS DISORDER): ICD-10-CM

## 2023-11-01 DIAGNOSIS — F41.1 GAD (GENERALIZED ANXIETY DISORDER): ICD-10-CM

## 2023-11-01 DIAGNOSIS — F32.1 CURRENT MODERATE EPISODE OF MAJOR DEPRESSIVE DISORDER, UNSPECIFIED WHETHER RECURRENT: Primary | ICD-10-CM

## 2023-11-01 PROCEDURE — 99212 OFFICE O/P EST SF 10 MIN: CPT | Mod: PBBFAC | Performed by: NURSE PRACTITIONER

## 2023-11-01 PROCEDURE — 3079F DIAST BP 80-89 MM HG: CPT | Mod: SA,HB,CPTII, | Performed by: NURSE PRACTITIONER

## 2023-11-01 PROCEDURE — 3079F PR MOST RECENT DIASTOLIC BLOOD PRESSURE 80-89 MM HG: ICD-10-PCS | Mod: SA,HB,CPTII, | Performed by: NURSE PRACTITIONER

## 2023-11-01 PROCEDURE — 90833 PR PSYCHOTHERAPY W/PATIENT W/E&M, 30 MIN (ADD ON): ICD-10-PCS | Mod: SA,HB,, | Performed by: NURSE PRACTITIONER

## 2023-11-01 PROCEDURE — 99214 PR OFFICE/OUTPT VISIT, EST, LEVL IV, 30-39 MIN: ICD-10-PCS | Mod: S$PBB,SA,HB, | Performed by: NURSE PRACTITIONER

## 2023-11-01 PROCEDURE — 99214 OFFICE O/P EST MOD 30 MIN: CPT | Mod: S$PBB,SA,HB, | Performed by: NURSE PRACTITIONER

## 2023-11-01 PROCEDURE — 3075F SYST BP GE 130 - 139MM HG: CPT | Mod: SA,HB,CPTII, | Performed by: NURSE PRACTITIONER

## 2023-11-01 PROCEDURE — 3075F PR MOST RECENT SYSTOLIC BLOOD PRESS GE 130-139MM HG: ICD-10-PCS | Mod: SA,HB,CPTII, | Performed by: NURSE PRACTITIONER

## 2023-11-01 PROCEDURE — 99999 PR PBB SHADOW E&M-EST. PATIENT-LVL II: CPT | Mod: PBBFAC,SA,HB, | Performed by: NURSE PRACTITIONER

## 2023-11-01 PROCEDURE — 3008F PR BODY MASS INDEX (BMI) DOCUMENTED: ICD-10-PCS | Mod: SA,HB,CPTII, | Performed by: NURSE PRACTITIONER

## 2023-11-01 PROCEDURE — 4010F ACE/ARB THERAPY RXD/TAKEN: CPT | Mod: SA,HB,CPTII, | Performed by: NURSE PRACTITIONER

## 2023-11-01 PROCEDURE — 4010F PR ACE/ARB THEARPY RXD/TAKEN: ICD-10-PCS | Mod: SA,HB,CPTII, | Performed by: NURSE PRACTITIONER

## 2023-11-01 PROCEDURE — 90833 PSYTX W PT W E/M 30 MIN: CPT | Mod: SA,HB,, | Performed by: NURSE PRACTITIONER

## 2023-11-01 PROCEDURE — 3008F BODY MASS INDEX DOCD: CPT | Mod: SA,HB,CPTII, | Performed by: NURSE PRACTITIONER

## 2023-11-01 PROCEDURE — 99999 PR PBB SHADOW E&M-EST. PATIENT-LVL II: ICD-10-PCS | Mod: PBBFAC,SA,HB, | Performed by: NURSE PRACTITIONER

## 2023-11-01 RX ORDER — AMLODIPINE BESYLATE 5 MG/1
5 TABLET ORAL DAILY
COMMUNITY

## 2023-11-01 RX ORDER — VENLAFAXINE HYDROCHLORIDE 37.5 MG/1
CAPSULE, EXTENDED RELEASE ORAL
Qty: 180 CAPSULE | Refills: 0 | Status: SHIPPED | OUTPATIENT
Start: 2023-11-01 | End: 2024-02-28 | Stop reason: SDUPTHER

## 2023-11-01 RX ORDER — QUETIAPINE FUMARATE 300 MG/1
300 TABLET, FILM COATED ORAL NIGHTLY
Qty: 90 TABLET | Refills: 3 | Status: SHIPPED | OUTPATIENT
Start: 2023-11-01

## 2023-11-01 RX ORDER — AMLODIPINE BESYLATE 10 MG/1
5 TABLET ORAL DAILY
COMMUNITY
End: 2023-11-01

## 2023-11-01 RX ORDER — RIZATRIPTAN BENZOATE 10 MG/1
10 TABLET ORAL
COMMUNITY

## 2023-11-01 RX ORDER — HYDRALAZINE HYDROCHLORIDE 25 MG/1
25 TABLET, FILM COATED ORAL 3 TIMES DAILY
COMMUNITY

## 2023-11-01 NOTE — PROGRESS NOTES
Outpatient Psychiatry Follow-Up Visit (MD/NP)    11/1/2023    Clinical Status of Patient:  Outpatient (Ambulatory)    Chief Complaint:  Gwen Patiño is a 53 y.o. female who presents today for follow-up of depression and anxiety.  Met with patient.      Interval History and Content of Current Session:    Social/medical updates -- son (27 y/o) lives with her. Son attends college. Has 2 older sons who live out of state.    Mood -- persistent depressed mood. Reports minimal to no response to past antidepressant trials. Endorses persistent low mood, hopelessness, fatigue, insomnia, poor concentration, feelings of worthlessness, passive SI (denies active SI, no SIB). No vivek.   Anxiety -- chronic excessive worry that is difficult to control. Feels on edge, restless, and has difficulty relaxing majority of the time.   Attention -- poor concentration as described above, associated with depression  Psychosis -- chronic hypnopompic hallucinations of ants/spiders. Largely does not find this distressing anymore. No AH. No paranoia. No delusions. No evidence of thought disorder.  Sleep -- chronic middle insomnia, uses CPAP nightly   Energy -- easily fatigued  Appetite -- poor, weight relatively stable     Substance use -- non-smoker. No ETOH. Medical marijuana daily. No illicit substance use.     Medications:    Amitriptyline 50 mg qHS -- discontinue   Quetiapine 300 mg qHS     Start: Venlafaxine XR -> titrate to 75 mg daily     Past medication trials -- sertraline, fluoxetine, bupropion, escitalopram, duloxetine, lamotrigine, prazosin, eszopiclone, amitriptyline, quetiapine, alprazolam, lorazepam    Screening tools:    11/01/23 -- PHQ-9 = 26    11/01/23 -- MARQUITA-7 = 21    Psychotherapy:  Target symptoms: depression, anxiety , insomnia  Why chosen therapy is appropriate versus another modality: relevant to diagnosis, evidence based practice  Outcome monitoring methods: self-report, observation  Therapeutic intervention type:  insight oriented psychotherapy, behavior modifying psychotherapy, supportive psychotherapy, CBT-I  Topics discussed/themes: building skills sets for symptom management, symptom recognition  The patient's response to the intervention is accepting. The patient's progress toward treatment goals is limited.   Duration of intervention: 18 minutes.    Brief synopsis:  MDD, MARQUITA, PTSD, denies SI(active)/HI/AVH      Review of Systems   PSYCHIATRIC: Pertinant items are noted in the narrative.  CONSTITUTIONAL: No weight gain or loss.   MUSCULOSKELETAL: chronic arthritic pain  NEUROLOGIC: No weakness, sensory changes, seizures, confusion, memory loss, tremor or other abnormal movements.  GASTROINTESTINAL: No nausea, vomiting, pain, constipation or diarrhea.    Past Medical, Family and Social History: The patient's past medical, family and social history have been reviewed and updated as appropriate within the electronic medical record - see encounter notes.    Compliance: see above    Side effects: see above    Risk Parameters:  Patient reports no suicidal ideation  Patient reports no homicidal ideation  Patient reports no self-injurious behavior  Patient reports no violent behavior    Exam (detailed: at least 9 elements; comprehensive: all 15 elements)   Constitutional  Vitals:  Most recent vital signs, dated less than 90 days prior to this appointment, were reviewed.   Vitals:    11/01/23 1044   BP: 138/82   Pulse: 69   Weight: 84.8 kg (186 lb 15.2 oz)        General:  unremarkable, age appropriate     Musculoskeletal  Muscle Strength/Tone:  no spasicity, no rigidity, no cogwheeling   Gait & Station:  non-ataxic     Psychiatric  Speech:  no latency; no press   Mood & Affect:  anxious, depressed  mood-congruent, brightens at times   Thought Process:  normal and logical   Associations:  intact   Thought Content:  normal, no suicidality, no homicidality, delusions, or paranoia   Insight:  intact   Judgement: behavior is adequate  to circumstances   Orientation:  grossly intact   Memory: intact for content of interview   Language: grossly intact   Attention Span & Concentration:  able to focus   Fund of Knowledge:  intact and appropriate to age and level of education     Assessment and Diagnosis   Status/Progress: Based on the examination today, the patient's problem(s) is/are inadequately controlled and treatment resistant.  New problems have not been presented today.   Co-morbidities, Diagnostic uncertainty, and Lack of compliance are not complicating management of the primary condition.  There are no active rule-out diagnoses for this patient at this time.     General Impression: Gwen Patiño is a 53 y.o. female with a psychiatric hx of MDD, MARQUITA, and PTSD. Medical hx significant for HTN, rheumatoid arthritis, iron deficiency anemia, asthma, migraines.  Past psychiatric treatment includes multiple antidepressant trials since her late 20s. Depressive sx resistant to previous treatment regimens despite antidepressant augmentation with antipsychotics and mood stabilizers.. No hx of psychiatric hospitalizations. No hx of SA, SIB, or violence. No hx of substance abuse. Adult son lives with her. On disability, previously employed in patient care roles.     11/01/23 -- treatment resistant depression, ongoing anxiety sx. No response to amitriptyline -> discontinue. Start venlafaxine XR -> titrate to 75 mg (target dose of 150 mg +). Continue quetiapine as prescribed.        ICD-10-CM ICD-9-CM   1. Current moderate episode of major depressive disorder, unspecified whether recurrent  F32.1 296.22   2. MARQUITA (generalized anxiety disorder)  F41.1 300.02   3. PTSD (post-traumatic stress disorder)  F43.10 309.81   4. Cognitive disorder  F09 294.9       Intervention/Counseling/Treatment Plan   Reviewed patient's current sx and medication regimen  Discontinue amitriptyline   Start Venlafaxine XR  With medication failure would likely need to explore other  options for treatment resistant depression such as ECT  Continue quetiapine as prescribed  Informed patient there will likely be minimal benefit for sleep with further increase  Reviewed sleep habits  Discussed sleep consolidation to improve quality of sleep  Currently falls asleep ~ 8-9p and wakes ~ 2-3am  Reviewed techniques to push sleep window back     Medication Management  Prescription drug management was employed during the encounter, as medications were prescribed, or considered but not prescribed.   [place]  The risks and benefits of medication were discussed with the patient.  Possible expectable adverse effects of any current or proposed individual psychotropic agents were discussed with this patient.  Counseling was provided on the importance of full compliance with any prescribed medication.  Detailed instructions were provided to the patient regarding the administration of any prescribed medication.  Patient voiced understanding    Return to Clinic:  6 weeks

## 2024-02-28 DIAGNOSIS — F32.1 CURRENT MODERATE EPISODE OF MAJOR DEPRESSIVE DISORDER, UNSPECIFIED WHETHER RECURRENT: ICD-10-CM

## 2024-02-28 RX ORDER — VENLAFAXINE HYDROCHLORIDE 37.5 MG/1
CAPSULE, EXTENDED RELEASE ORAL
Qty: 180 CAPSULE | Refills: 0 | Status: SHIPPED | OUTPATIENT
Start: 2024-02-28 | End: 2024-05-09 | Stop reason: SDUPTHER

## 2024-03-13 ENCOUNTER — PATIENT MESSAGE (OUTPATIENT)
Dept: PSYCHIATRY | Facility: CLINIC | Age: 54
End: 2024-03-13
Payer: MEDICAID

## 2024-05-09 ENCOUNTER — OFFICE VISIT (OUTPATIENT)
Dept: PSYCHIATRY | Facility: CLINIC | Age: 54
End: 2024-05-09
Payer: COMMERCIAL

## 2024-05-09 VITALS
HEART RATE: 88 BPM | DIASTOLIC BLOOD PRESSURE: 70 MMHG | WEIGHT: 185.31 LBS | SYSTOLIC BLOOD PRESSURE: 116 MMHG | BODY MASS INDEX: 30.84 KG/M2

## 2024-05-09 DIAGNOSIS — Z79.899 HIGH RISK MEDICATION USE: ICD-10-CM

## 2024-05-09 DIAGNOSIS — F43.10 PTSD (POST-TRAUMATIC STRESS DISORDER): Primary | ICD-10-CM

## 2024-05-09 DIAGNOSIS — F32.1 CURRENT MODERATE EPISODE OF MAJOR DEPRESSIVE DISORDER, UNSPECIFIED WHETHER RECURRENT: ICD-10-CM

## 2024-05-09 DIAGNOSIS — F41.1 GAD (GENERALIZED ANXIETY DISORDER): ICD-10-CM

## 2024-05-09 PROCEDURE — 4010F ACE/ARB THERAPY RXD/TAKEN: CPT | Mod: CPTII,S$GLB,, | Performed by: NURSE PRACTITIONER

## 2024-05-09 PROCEDURE — 3074F SYST BP LT 130 MM HG: CPT | Mod: CPTII,S$GLB,, | Performed by: NURSE PRACTITIONER

## 2024-05-09 PROCEDURE — 99999 PR PBB SHADOW E&M-EST. PATIENT-LVL II: CPT | Mod: PBBFAC,,, | Performed by: NURSE PRACTITIONER

## 2024-05-09 PROCEDURE — 3078F DIAST BP <80 MM HG: CPT | Mod: CPTII,S$GLB,, | Performed by: NURSE PRACTITIONER

## 2024-05-09 PROCEDURE — 99214 OFFICE O/P EST MOD 30 MIN: CPT | Mod: S$GLB,,, | Performed by: NURSE PRACTITIONER

## 2024-05-09 PROCEDURE — 3008F BODY MASS INDEX DOCD: CPT | Mod: CPTII,S$GLB,, | Performed by: NURSE PRACTITIONER

## 2024-05-09 RX ORDER — QUETIAPINE FUMARATE 300 MG/1
300 TABLET, FILM COATED ORAL NIGHTLY
Qty: 90 TABLET | Refills: 3 | Status: SHIPPED | OUTPATIENT
Start: 2024-05-09

## 2024-05-09 RX ORDER — VENLAFAXINE HYDROCHLORIDE 150 MG/1
150 CAPSULE, EXTENDED RELEASE ORAL DAILY
Qty: 90 CAPSULE | Refills: 2 | Status: SHIPPED | OUTPATIENT
Start: 2024-05-09

## 2024-05-09 NOTE — PROGRESS NOTES
Outpatient Psychiatry Follow-Up Visit (MD/NP)    5/9/2024    Clinical Status of Patient:  Outpatient (Ambulatory)    Chief Complaint:  Gwen Patiño is a 54 y.o. female who presents today for follow-up of depression and anxiety.  Met with patient and son     Interval History and Content of Current Session:    Social/medical updates -- son (25 y/o) lives with her. 1 son attends college. Has 2 older sons who live out of state. Younger sister passed away from pneumonia in December 2023.    Mood -- minimal change from baseline. States that some days are better than others. Additional grief from sister's death. Patient minimally engaging in conversation today, offers little spontaneously. Affect largely flat. Mildly distracted. No change in chronic passive SI. No active SI with plan or intent. No hypomania or vivek.   Anxiety -- no change from baseline. Chronic excessive worry that is difficult to control. Unable to identify viable coping skills which she utilizes to assist in controlling anxiety.   Attention -- no change from baseline, chronically poor   Psychosis -- denies recent perceptual disturbance. Has hx of hypnopompic hallucinations of ants/spiders.   Sleep -- improved, less nighttime awakenings, no nightmares, consistent with CPAP  Energy -- low, easily fatigued, no change  Appetite -- variable, weight stable, 185 lb today    Substance use -- chronic daily THC use. Denies other substance use.    Medications:    Venlafaxine XR 75 mg daily -- compliant, denies SE, titrate to 150 mg daily   Quetiapine 300 mg qHS -- compliant, denies SE    Past medication trials -- sertraline, fluoxetine, bupropion, escitalopram, duloxetine, lamotrigine, prazosin, eszopiclone, amitriptyline, quetiapine, alprazolam, lorazepam    Screening tools:    11/01/23 -- PHQ-9 = 26  MARQUITA-7 = 21  05/09/24 -- PHQ-9 = 23  MARQUITA-7 = 21      Brief synopsis:  MDD, MARQUITA, PTSD, denies active SI/HI/AVH      Review of Systems   PSYCHIATRIC: Pertinant  items are noted in the narrative.  CONSTITUTIONAL: No weight gain or loss.   MUSCULOSKELETAL: chronic arthritic pain  NEUROLOGIC: No weakness, sensory changes, seizures, confusion, memory loss, tremor or other abnormal movements.  GASTROINTESTINAL: No nausea, vomiting, pain, constipation or diarrhea.    Past Medical, Family and Social History: The patient's past medical, family and social history have been reviewed and updated as appropriate within the electronic medical record - see encounter notes.    son (25 y/o) lives with her. Son attends college. Has 2 older sons who live out of state.     Compliance: see above    Side effects: see above    Risk Parameters:  Patient reports no suicidal ideation  Patient reports no homicidal ideation  Patient reports no self-injurious behavior  Patient reports no violent behavior    Exam (detailed: at least 9 elements; comprehensive: all 15 elements)   Constitutional  Vitals:  Most recent vital signs, dated less than 90 days prior to this appointment, were reviewed.   Vitals:    05/09/24 0758   BP: 116/70   Pulse: 88   Weight: 84.1 kg (185 lb 4.8 oz)          General:  unremarkable, age appropriate, guarded     Musculoskeletal  Muscle Strength/Tone:  no spasicity, no rigidity, no cogwheeling   Gait & Station:  non-ataxic     Psychiatric  Speech:  non-spontaneous   Mood & Affect:  anxious, depressed  flat   Thought Process:  normal and logical   Associations:  intact   Thought Content:  normal, no suicidality, no homicidality, delusions, or paranoia   Insight:  intact   Judgement: behavior is adequate to circumstances   Orientation:  grossly intact   Memory: intact for content of interview   Language: grossly intact   Attention Span & Concentration:  distracted   Fund of Knowledge:  intact and appropriate to age and level of education     Assessment and Diagnosis   Status/Progress: Based on the examination today, the patient's problem(s) is/are inadequately controlled and  treatment resistant.  New problems have not been presented today.   Co-morbidities, Diagnostic uncertainty, and Lack of compliance are not complicating management of the primary condition.  There are no active rule-out diagnoses for this patient at this time.     General Impression: Gwen Patiño is a 54 y.o. female with a psychiatric hx of MDD, MARQUITA, and PTSD. Medical hx significant for HTN, prolonged QT, rheumatoid arthritis, iron deficiency anemia, asthma, migraines.  Past psychiatric treatment includes multiple antidepressant trials since her late 20s. Depressive sx resistant to previous treatment regimens despite antidepressant augmentation with antipsychotics and mood stabilizers.. No hx of psychiatric hospitalizations. No hx of SA, SIB, or violence. No hx of substance abuse. Adult son lives with her. On disability, previously employed in patient care roles.     11/01/23 -- treatment resistant depression, ongoing anxiety sx. No response to amitriptyline -> discontinue. Start venlafaxine XR -> titrate to 75 mg (target dose of 150 mg +). Continue quetiapine as prescribed.    05/09/24 -- minimal change in sx with change to Venlafaxine XR. Will titrate to 150 mg daily. Continue quetiapine as prescribed. Therapy strongly encouraged        ICD-10-CM ICD-9-CM   1. PTSD (post-traumatic stress disorder)  F43.10 309.81   2. Current moderate episode of major depressive disorder, unspecified whether recurrent  F32.1 296.22   3. MARQUITA (generalized anxiety disorder)  F41.1 300.02   4. High risk medication use  Z79.899 V58.69         Intervention/Counseling/Treatment Plan   Reviewed patient's current sx and medication regimen  Medication changes as above  BP WNL today  Psychotherapy strongly encouraged  Patient unreceptive   Annual labs ordered  CMP, lipid panel, HbA1c    Medication Management  Prescription drug management was employed during the encounter, as medications were prescribed, or considered but not prescribed.    [facility] reviewed  The risks and benefits of medication were discussed with the patient.  Possible expectable adverse effects of any current or proposed individual psychotropic agents were discussed with this patient.  Counseling was provided on the importance of full compliance with any prescribed medication.  Detailed instructions were provided to the patient regarding the administration of any prescribed medication.  Patient voiced understanding    Return to Clinic:  3 months

## 2025-03-20 ENCOUNTER — PATIENT MESSAGE (OUTPATIENT)
Dept: PSYCHIATRY | Facility: CLINIC | Age: 55
End: 2025-03-20
Payer: MEDICAID

## 2025-03-20 DIAGNOSIS — F32.1 CURRENT MODERATE EPISODE OF MAJOR DEPRESSIVE DISORDER, UNSPECIFIED WHETHER RECURRENT: ICD-10-CM

## 2025-03-20 RX ORDER — QUETIAPINE FUMARATE 300 MG/1
300 TABLET, FILM COATED ORAL NIGHTLY
Qty: 30 TABLET | Refills: 0 | Status: SHIPPED | OUTPATIENT
Start: 2025-03-20

## 2025-03-20 RX ORDER — QUETIAPINE FUMARATE 300 MG/1
300 TABLET, FILM COATED ORAL NIGHTLY
Qty: 90 TABLET | Refills: 3 | OUTPATIENT
Start: 2025-03-20